# Patient Record
Sex: FEMALE | Race: WHITE | NOT HISPANIC OR LATINO | Employment: OTHER | URBAN - METROPOLITAN AREA
[De-identification: names, ages, dates, MRNs, and addresses within clinical notes are randomized per-mention and may not be internally consistent; named-entity substitution may affect disease eponyms.]

---

## 2017-02-01 ENCOUNTER — OFFICE VISIT (OUTPATIENT)
Dept: FAMILY MEDICINE CLINIC | Facility: CLINIC | Age: 57
End: 2017-02-01

## 2017-02-01 VITALS
TEMPERATURE: 97.9 F | OXYGEN SATURATION: 97 % | HEART RATE: 83 BPM | SYSTOLIC BLOOD PRESSURE: 124 MMHG | HEIGHT: 67 IN | BODY MASS INDEX: 26.68 KG/M2 | WEIGHT: 170 LBS | DIASTOLIC BLOOD PRESSURE: 70 MMHG

## 2017-02-01 DIAGNOSIS — R23.3 ECCHYMOSES, SPONTANEOUS: Primary | ICD-10-CM

## 2017-02-01 DIAGNOSIS — I10 ESSENTIAL HYPERTENSION: ICD-10-CM

## 2017-02-01 DIAGNOSIS — H81.03 MENIERE DISEASE, BILATERAL: ICD-10-CM

## 2017-02-01 LAB
BASOPHILS # BLD AUTO: 0.03 10*3/MM3 (ref 0–0.2)
BASOPHILS NFR BLD AUTO: 0.4 % (ref 0–1)
DEPRECATED RDW RBC AUTO: 42.3 FL (ref 37–54)
EOSINOPHIL # BLD AUTO: 0.18 10*3/MM3 (ref 0.1–0.3)
EOSINOPHIL NFR BLD AUTO: 2.5 % (ref 0–3)
ERYTHROCYTE [DISTWIDTH] IN BLOOD BY AUTOMATED COUNT: 12.7 % (ref 11.3–14.5)
HCT VFR BLD AUTO: 42.6 % (ref 34.5–44)
HGB BLD-MCNC: 14 G/DL (ref 11.5–15.5)
IMM GRANULOCYTES # BLD: 0.03 10*3/MM3 (ref 0–0.03)
IMM GRANULOCYTES NFR BLD: 0.4 % (ref 0–0.6)
LYMPHOCYTES # BLD AUTO: 2.4 10*3/MM3 (ref 0.6–4.8)
LYMPHOCYTES NFR BLD AUTO: 33.3 % (ref 24–44)
MCH RBC QN AUTO: 29.7 PG (ref 27–31)
MCHC RBC AUTO-ENTMCNC: 32.9 G/DL (ref 32–36)
MCV RBC AUTO: 90.4 FL (ref 80–99)
MONOCYTES # BLD AUTO: 0.4 10*3/MM3 (ref 0–1)
MONOCYTES NFR BLD AUTO: 5.6 % (ref 0–12)
NEUTROPHILS # BLD AUTO: 4.16 10*3/MM3 (ref 1.5–8.3)
NEUTROPHILS NFR BLD AUTO: 57.8 % (ref 41–71)
PLATELET # BLD AUTO: 358 10*3/MM3 (ref 150–450)
PLATELET # BLD AUTO: 358 10*3/MM3 (ref 150–450)
PMV BLD AUTO: 10.7 FL (ref 6–12)
RBC # BLD AUTO: 4.71 10*6/MM3 (ref 3.89–5.14)
WBC NRBC COR # BLD: 7.2 10*3/MM3 (ref 3.5–10.8)

## 2017-02-01 PROCEDURE — 85025 COMPLETE CBC W/AUTO DIFF WBC: CPT | Performed by: FAMILY MEDICINE

## 2017-02-01 PROCEDURE — 85049 AUTOMATED PLATELET COUNT: CPT | Performed by: FAMILY MEDICINE

## 2017-02-01 PROCEDURE — 36415 COLL VENOUS BLD VENIPUNCTURE: CPT | Performed by: FAMILY MEDICINE

## 2017-02-01 PROCEDURE — 99213 OFFICE O/P EST LOW 20 MIN: CPT | Performed by: FAMILY MEDICINE

## 2017-02-01 RX ORDER — LOSARTAN POTASSIUM 25 MG/1
TABLET ORAL
COMMUNITY
Start: 2014-11-04 | End: 2017-02-01 | Stop reason: SDUPTHER

## 2017-02-01 RX ORDER — ESTRADIOL 0.1 MG/D
FILM, EXTENDED RELEASE TRANSDERMAL 2 TIMES WEEKLY
COMMUNITY
Start: 2014-10-04

## 2017-02-01 RX ORDER — ESTRADIOL 10 UG/1
INSERT VAGINAL
COMMUNITY
Start: 2014-08-19 | End: 2018-09-14

## 2017-02-01 RX ORDER — DIAZEPAM 2 MG/1
2 TABLET ORAL 2 TIMES DAILY PRN
Qty: 30 TABLET | Refills: 0
Start: 2017-02-01 | End: 2018-09-14

## 2017-02-01 RX ORDER — LOSARTAN POTASSIUM 25 MG/1
25 TABLET ORAL DAILY
Qty: 90 TABLET | Refills: 0 | Status: SHIPPED | OUTPATIENT
Start: 2017-02-01 | End: 2017-02-05 | Stop reason: SDUPTHER

## 2017-02-01 RX ORDER — DIAZEPAM 2 MG/1
2 TABLET ORAL 2 TIMES DAILY PRN
COMMUNITY
End: 2017-02-01 | Stop reason: SDUPTHER

## 2017-02-01 NOTE — PROGRESS NOTES
"Jenny Gonsalez is a 56 y.o. female.     History of Present Illness   The patient presents today with c/o bruising of her legs.  States this is intermittent.    Also a follow up on Hypertension.    BP is 124/70.  Taking Losartan 25 mg daily    Also needing a refill for Diazepam 2 mg to use as needed for inner ear.    The following portions of the patient's history were reviewed and updated as appropriate: allergies, current medications, past social history and problem list.    Review of Systems   Hematological: Bruises/bleeds easily.       Objective   Visit Vitals   • /70   • Pulse 83   • Temp 97.9 °F (36.6 °C)   • Ht 67\" (170.2 cm)   • Wt 170 lb (77.1 kg)   • SpO2 97%   • BMI 26.63 kg/m2     Physical Exam   Constitutional: She appears well-developed and well-nourished.   Skin: Bruising (right shin) noted.   Nursing note and vitals reviewed.      Assessment/Plan   Problem List Items Addressed This Visit     None      Visit Diagnoses     Ecchymoses, spontaneous    -  Primary    Meniere disease, bilateral                  Refill for Losartan 25 mg daily #90+3.  Refill Diazepam 2 mg twice a day as needed. #30+0.    Check a CBC and Platelet count. Report results by letter.    Follow up as needed.                    Scribed for Dr Jack Avery by Gogo Sandhu CMA.    I, Jack Avery MD, personally performed the services described in this documentation, as scribed by Gogo Sandhu in my presence, and is both accurate and complete.    "

## 2017-02-05 DIAGNOSIS — I10 ESSENTIAL HYPERTENSION: ICD-10-CM

## 2017-02-06 RX ORDER — LOSARTAN POTASSIUM 25 MG/1
TABLET ORAL
Qty: 90 TABLET | Refills: 0 | Status: SHIPPED | OUTPATIENT
Start: 2017-02-06 | End: 2017-07-16 | Stop reason: SDUPTHER

## 2017-02-22 ENCOUNTER — TELEPHONE (OUTPATIENT)
Dept: FAMILY MEDICINE CLINIC | Facility: CLINIC | Age: 57
End: 2017-02-22

## 2017-02-22 NOTE — TELEPHONE ENCOUNTER
I spoke to the patient on the telephone. I advised her that her CBC and platelet count were both normal. She was glad to learn of this.

## 2017-02-23 ENCOUNTER — TELEPHONE (OUTPATIENT)
Dept: FAMILY MEDICINE CLINIC | Facility: CLINIC | Age: 57
End: 2017-02-23

## 2017-02-23 NOTE — TELEPHONE ENCOUNTER
----- Message from Micaela Melgar sent at 2/22/2017  2:09 PM EST -----  Contact: [phone]  PATIENT NEEDS TO HAVE A CALL BACK TO DISCUSS THE STATUS OF HER LAB LETTER

## 2017-07-16 DIAGNOSIS — I10 ESSENTIAL HYPERTENSION: ICD-10-CM

## 2017-07-17 RX ORDER — LOSARTAN POTASSIUM 25 MG/1
TABLET ORAL
Qty: 90 TABLET | Refills: 0 | Status: SHIPPED | OUTPATIENT
Start: 2017-07-17 | End: 2017-12-13 | Stop reason: SDUPTHER

## 2017-12-13 DIAGNOSIS — I10 ESSENTIAL HYPERTENSION: ICD-10-CM

## 2017-12-13 RX ORDER — LOSARTAN POTASSIUM 25 MG/1
TABLET ORAL
Qty: 90 TABLET | Refills: 0 | Status: SHIPPED | OUTPATIENT
Start: 2017-12-13 | End: 2018-02-25 | Stop reason: SDUPTHER

## 2018-02-25 DIAGNOSIS — I10 ESSENTIAL HYPERTENSION: ICD-10-CM

## 2018-02-25 RX ORDER — LOSARTAN POTASSIUM 25 MG/1
25 TABLET ORAL DAILY
Qty: 90 TABLET | Refills: 1 | Status: SHIPPED | OUTPATIENT
Start: 2018-02-25 | End: 2018-08-16 | Stop reason: SDUPTHER

## 2018-04-13 ENCOUNTER — TRANSCRIBE ORDERS (OUTPATIENT)
Dept: ADMINISTRATIVE | Facility: HOSPITAL | Age: 58
End: 2018-04-13

## 2018-04-13 DIAGNOSIS — R10.11 RUQ ABDOMINAL PAIN: Primary | ICD-10-CM

## 2018-04-17 ENCOUNTER — HOSPITAL ENCOUNTER (OUTPATIENT)
Dept: CT IMAGING | Facility: HOSPITAL | Age: 58
Discharge: HOME | End: 2018-04-17
Attending: SURGERY | Admitting: SURGERY

## 2018-04-17 DIAGNOSIS — R10.11 RUQ ABDOMINAL PAIN: ICD-10-CM

## 2018-04-17 PROCEDURE — 74150 CT ABDOMEN W/O CONTRAST: CPT

## 2018-04-17 RX ADMIN — BARIUM SULFATE 450 ML: 21 SUSPENSION ORAL at 08:40

## 2018-05-03 ENCOUNTER — APPOINTMENT (OUTPATIENT)
Dept: PREADMISSION TESTING | Facility: HOSPITAL | Age: 58
End: 2018-05-03

## 2018-05-03 LAB
ALBUMIN SERPL-MCNC: 4 G/DL (ref 3.2–4.8)
ALBUMIN/GLOB SERPL: 1.5 G/DL (ref 1.5–2.5)
ALP SERPL-CCNC: 68 U/L (ref 25–100)
ALT SERPL W P-5'-P-CCNC: 33 U/L (ref 7–40)
ANION GAP SERPL CALCULATED.3IONS-SCNC: 6 MMOL/L (ref 3–11)
AST SERPL-CCNC: 20 U/L (ref 0–33)
BILIRUB SERPL-MCNC: 0.4 MG/DL (ref 0.3–1.2)
BUN BLD-MCNC: 9 MG/DL (ref 9–23)
BUN/CREAT SERPL: 10 (ref 7–25)
CALCIUM SPEC-SCNC: 8.7 MG/DL (ref 8.7–10.4)
CHLORIDE SERPL-SCNC: 102 MMOL/L (ref 99–109)
CO2 SERPL-SCNC: 30 MMOL/L (ref 20–31)
CREAT BLD-MCNC: 0.9 MG/DL (ref 0.6–1.3)
DEPRECATED RDW RBC AUTO: 43.1 FL (ref 37–54)
ERYTHROCYTE [DISTWIDTH] IN BLOOD BY AUTOMATED COUNT: 13.3 % (ref 11.3–14.5)
GFR SERPL CREATININE-BSD FRML MDRD: 64 ML/MIN/1.73
GLOBULIN UR ELPH-MCNC: 2.6 GM/DL
GLUCOSE BLD-MCNC: 71 MG/DL (ref 70–100)
HCT VFR BLD AUTO: 41.6 % (ref 34.5–44)
HGB BLD-MCNC: 13.5 G/DL (ref 11.5–15.5)
MCH RBC QN AUTO: 28.8 PG (ref 27–31)
MCHC RBC AUTO-ENTMCNC: 32.5 G/DL (ref 32–36)
MCV RBC AUTO: 88.7 FL (ref 80–99)
PLATELET # BLD AUTO: 322 10*3/MM3 (ref 150–450)
PMV BLD AUTO: 10.6 FL (ref 6–12)
POTASSIUM BLD-SCNC: 3.7 MMOL/L (ref 3.5–5.5)
PROT SERPL-MCNC: 6.6 G/DL (ref 5.7–8.2)
RBC # BLD AUTO: 4.69 10*6/MM3 (ref 3.89–5.14)
SODIUM BLD-SCNC: 138 MMOL/L (ref 132–146)
WBC NRBC COR # BLD: 6.79 10*3/MM3 (ref 3.5–10.8)

## 2018-05-03 PROCEDURE — 93005 ELECTROCARDIOGRAM TRACING: CPT

## 2018-05-03 PROCEDURE — 80053 COMPREHEN METABOLIC PANEL: CPT | Performed by: SURGERY

## 2018-05-03 PROCEDURE — 93010 ELECTROCARDIOGRAM REPORT: CPT | Performed by: INTERNAL MEDICINE

## 2018-05-03 PROCEDURE — 36415 COLL VENOUS BLD VENIPUNCTURE: CPT

## 2018-05-03 PROCEDURE — 85027 COMPLETE CBC AUTOMATED: CPT | Performed by: SURGERY

## 2018-05-08 ENCOUNTER — HOSPITAL ENCOUNTER (OUTPATIENT)
Facility: HOSPITAL | Age: 58
Setting detail: HOSPITAL OUTPATIENT SURGERY
Discharge: HOME | End: 2018-05-08
Attending: SURGERY | Admitting: SURGERY

## 2018-05-08 ENCOUNTER — ANESTHESIA (OUTPATIENT)
Dept: PERIOP | Facility: HOSPITAL | Age: 58
End: 2018-05-08

## 2018-05-08 ENCOUNTER — ANESTHESIA EVENT (OUTPATIENT)
Dept: PERIOP | Facility: HOSPITAL | Age: 58
End: 2018-05-08

## 2018-05-08 ENCOUNTER — APPOINTMENT (OUTPATIENT)
Dept: GENERAL RADIOLOGY | Facility: HOSPITAL | Age: 58
End: 2018-05-08

## 2018-05-08 VITALS
SYSTOLIC BLOOD PRESSURE: 119 MMHG | TEMPERATURE: 97.6 F | OXYGEN SATURATION: 97 % | RESPIRATION RATE: 17 BRPM | HEART RATE: 75 BPM | DIASTOLIC BLOOD PRESSURE: 71 MMHG

## 2018-05-08 DIAGNOSIS — K80.20 CHOLELITHIASIS: ICD-10-CM

## 2018-05-08 PROBLEM — H81.10 BENIGN PAROXYSMAL POSITIONAL VERTIGO: Status: ACTIVE | Noted: 2018-05-08

## 2018-05-08 PROBLEM — I10 HYPERTENSION: Status: ACTIVE | Noted: 2018-05-08

## 2018-05-08 LAB — POTASSIUM BLDA-SCNC: 4.31 MMOL/L (ref 3.5–5.3)

## 2018-05-08 PROCEDURE — 25010000002 PROPOFOL 10 MG/ML EMULSION: Performed by: NURSE ANESTHETIST, CERTIFIED REGISTERED

## 2018-05-08 PROCEDURE — 88304 TISSUE EXAM BY PATHOLOGIST: CPT | Performed by: SURGERY

## 2018-05-08 PROCEDURE — 84132 ASSAY OF SERUM POTASSIUM: CPT | Performed by: ANESTHESIOLOGY

## 2018-05-08 PROCEDURE — 74300 X-RAY BILE DUCTS/PANCREAS: CPT

## 2018-05-08 PROCEDURE — 25010000002 FENTANYL CITRATE (PF) 100 MCG/2ML SOLUTION: Performed by: NURSE ANESTHETIST, CERTIFIED REGISTERED

## 2018-05-08 PROCEDURE — 25010000003 CEFAZOLIN IN DEXTROSE 2-4 GM/100ML-% SOLUTION: Performed by: SURGERY

## 2018-05-08 PROCEDURE — 25010000002 ONDANSETRON PER 1 MG: Performed by: NURSE ANESTHETIST, CERTIFIED REGISTERED

## 2018-05-08 PROCEDURE — 25010000002 IOPAMIDOL 61 % SOLUTION: Performed by: SURGERY

## 2018-05-08 PROCEDURE — 25010000002 PHENYLEPHRINE PER 1 ML: Performed by: NURSE ANESTHETIST, CERTIFIED REGISTERED

## 2018-05-08 PROCEDURE — 25010000002 PROMETHAZINE PER 50 MG: Performed by: NURSE ANESTHETIST, CERTIFIED REGISTERED

## 2018-05-08 PROCEDURE — 25010000002 PROPOFOL 1000 MG/ML EMULSION: Performed by: NURSE ANESTHETIST, CERTIFIED REGISTERED

## 2018-05-08 PROCEDURE — 25010000002 MIDAZOLAM PER 1 MG: Performed by: NURSE ANESTHETIST, CERTIFIED REGISTERED

## 2018-05-08 PROCEDURE — 25010000002 NEOSTIGMINE 10 MG/10ML SOLUTION: Performed by: NURSE ANESTHETIST, CERTIFIED REGISTERED

## 2018-05-08 PROCEDURE — 25010000002 DEXAMETHASONE PER 1 MG: Performed by: NURSE ANESTHETIST, CERTIFIED REGISTERED

## 2018-05-08 RX ORDER — PROPOFOL 10 MG/ML
VIAL (ML) INTRAVENOUS AS NEEDED
Status: DISCONTINUED | OUTPATIENT
Start: 2018-05-08 | End: 2018-05-08 | Stop reason: SURG

## 2018-05-08 RX ORDER — MIDAZOLAM HYDROCHLORIDE 1 MG/ML
INJECTION INTRAMUSCULAR; INTRAVENOUS AS NEEDED
Status: DISCONTINUED | OUTPATIENT
Start: 2018-05-08 | End: 2018-05-08 | Stop reason: SURG

## 2018-05-08 RX ORDER — ONDANSETRON 2 MG/ML
INJECTION INTRAMUSCULAR; INTRAVENOUS AS NEEDED
Status: DISCONTINUED | OUTPATIENT
Start: 2018-05-08 | End: 2018-05-08 | Stop reason: SURG

## 2018-05-08 RX ORDER — FENTANYL CITRATE 50 UG/ML
50 INJECTION, SOLUTION INTRAMUSCULAR; INTRAVENOUS
Status: DISCONTINUED | OUTPATIENT
Start: 2018-05-08 | End: 2018-05-08 | Stop reason: HOSPADM

## 2018-05-08 RX ORDER — FAMOTIDINE 20 MG/1
20 TABLET, FILM COATED ORAL ONCE
Status: COMPLETED | OUTPATIENT
Start: 2018-05-08 | End: 2018-05-08

## 2018-05-08 RX ORDER — SCOLOPAMINE TRANSDERMAL SYSTEM 1 MG/1
1 PATCH, EXTENDED RELEASE TRANSDERMAL
Status: DISCONTINUED | OUTPATIENT
Start: 2018-05-08 | End: 2018-05-08 | Stop reason: HOSPADM

## 2018-05-08 RX ORDER — CEFAZOLIN SODIUM 2 G/100ML
2 INJECTION, SOLUTION INTRAVENOUS ONCE
Status: COMPLETED | OUTPATIENT
Start: 2018-05-08 | End: 2018-05-08

## 2018-05-08 RX ORDER — SODIUM CHLORIDE, SODIUM LACTATE, POTASSIUM CHLORIDE, CALCIUM CHLORIDE 600; 310; 30; 20 MG/100ML; MG/100ML; MG/100ML; MG/100ML
9 INJECTION, SOLUTION INTRAVENOUS CONTINUOUS
Status: DISCONTINUED | OUTPATIENT
Start: 2018-05-08 | End: 2018-05-08 | Stop reason: HOSPADM

## 2018-05-08 RX ORDER — SODIUM CHLORIDE 9 MG/ML
INJECTION, SOLUTION INTRAVENOUS AS NEEDED
Status: DISCONTINUED | OUTPATIENT
Start: 2018-05-08 | End: 2018-05-08 | Stop reason: HOSPADM

## 2018-05-08 RX ORDER — SODIUM CHLORIDE 0.9 % (FLUSH) 0.9 %
1-10 SYRINGE (ML) INJECTION AS NEEDED
Status: DISCONTINUED | OUTPATIENT
Start: 2018-05-08 | End: 2018-05-08 | Stop reason: HOSPADM

## 2018-05-08 RX ORDER — PROMETHAZINE HYDROCHLORIDE 25 MG/1
25 TABLET ORAL EVERY 6 HOURS PRN
Qty: 30 TABLET | Refills: 0 | Status: SHIPPED | OUTPATIENT
Start: 2018-05-08 | End: 2020-06-02

## 2018-05-08 RX ORDER — FENTANYL CITRATE 50 UG/ML
INJECTION, SOLUTION INTRAMUSCULAR; INTRAVENOUS AS NEEDED
Status: DISCONTINUED | OUTPATIENT
Start: 2018-05-08 | End: 2018-05-08 | Stop reason: SURG

## 2018-05-08 RX ORDER — PROMETHAZINE HYDROCHLORIDE 25 MG/ML
6.25 INJECTION, SOLUTION INTRAMUSCULAR; INTRAVENOUS ONCE AS NEEDED
Status: COMPLETED | OUTPATIENT
Start: 2018-05-08 | End: 2018-05-08

## 2018-05-08 RX ORDER — DEXAMETHASONE SODIUM PHOSPHATE 10 MG/ML
INJECTION INTRAMUSCULAR; INTRAVENOUS AS NEEDED
Status: DISCONTINUED | OUTPATIENT
Start: 2018-05-08 | End: 2018-05-08 | Stop reason: SURG

## 2018-05-08 RX ORDER — PROMETHAZINE HYDROCHLORIDE 12.5 MG/1
12.5 TABLET ORAL ONCE AS NEEDED
Status: DISCONTINUED | OUTPATIENT
Start: 2018-05-08 | End: 2018-05-08 | Stop reason: HOSPADM

## 2018-05-08 RX ORDER — DOCUSATE SODIUM 250 MG
250 CAPSULE ORAL 2 TIMES DAILY
Qty: 20 CAPSULE | Refills: 0 | Status: SHIPPED | OUTPATIENT
Start: 2018-05-08 | End: 2018-09-14

## 2018-05-08 RX ORDER — ONDANSETRON 2 MG/ML
4 INJECTION INTRAMUSCULAR; INTRAVENOUS ONCE AS NEEDED
Status: DISCONTINUED | OUTPATIENT
Start: 2018-05-08 | End: 2018-05-08 | Stop reason: HOSPADM

## 2018-05-08 RX ORDER — OXYCODONE HYDROCHLORIDE AND ACETAMINOPHEN 5; 325 MG/1; MG/1
1 TABLET ORAL ONCE AS NEEDED
Status: DISCONTINUED | OUTPATIENT
Start: 2018-05-08 | End: 2018-05-08 | Stop reason: HOSPADM

## 2018-05-08 RX ORDER — OXYCODONE HYDROCHLORIDE AND ACETAMINOPHEN 5; 325 MG/1; MG/1
1-2 TABLET ORAL EVERY 4 HOURS PRN
Qty: 37 TABLET | Refills: 0 | Status: SHIPPED | OUTPATIENT
Start: 2018-05-08 | End: 2018-09-14

## 2018-05-08 RX ORDER — PROMETHAZINE HYDROCHLORIDE 25 MG/1
25 SUPPOSITORY RECTAL ONCE AS NEEDED
Status: COMPLETED | OUTPATIENT
Start: 2018-05-08 | End: 2018-05-08

## 2018-05-08 RX ORDER — PROMETHAZINE HYDROCHLORIDE 25 MG/1
25 TABLET ORAL ONCE AS NEEDED
Status: COMPLETED | OUTPATIENT
Start: 2018-05-08 | End: 2018-05-08

## 2018-05-08 RX ORDER — ROCURONIUM BROMIDE 10 MG/ML
INJECTION, SOLUTION INTRAVENOUS AS NEEDED
Status: DISCONTINUED | OUTPATIENT
Start: 2018-05-08 | End: 2018-05-08 | Stop reason: SURG

## 2018-05-08 RX ORDER — NEOSTIGMINE METHYLSULFATE 1 MG/ML
INJECTION, SOLUTION INTRAVENOUS AS NEEDED
Status: DISCONTINUED | OUTPATIENT
Start: 2018-05-08 | End: 2018-05-08 | Stop reason: SURG

## 2018-05-08 RX ORDER — HYDROMORPHONE HYDROCHLORIDE 1 MG/ML
0.5 INJECTION, SOLUTION INTRAMUSCULAR; INTRAVENOUS; SUBCUTANEOUS
Status: DISCONTINUED | OUTPATIENT
Start: 2018-05-08 | End: 2018-05-08 | Stop reason: HOSPADM

## 2018-05-08 RX ORDER — DOCUSATE SODIUM 100 MG/1
100 CAPSULE, LIQUID FILLED ORAL ONCE
Status: DISCONTINUED | OUTPATIENT
Start: 2018-05-08 | End: 2018-05-08 | Stop reason: HOSPADM

## 2018-05-08 RX ORDER — LIDOCAINE HYDROCHLORIDE 10 MG/ML
0.5 INJECTION, SOLUTION EPIDURAL; INFILTRATION; INTRACAUDAL; PERINEURAL ONCE AS NEEDED
Status: COMPLETED | OUTPATIENT
Start: 2018-05-08 | End: 2018-05-08

## 2018-05-08 RX ORDER — GLYCOPYRROLATE 0.2 MG/ML
INJECTION INTRAMUSCULAR; INTRAVENOUS AS NEEDED
Status: DISCONTINUED | OUTPATIENT
Start: 2018-05-08 | End: 2018-05-08 | Stop reason: SURG

## 2018-05-08 RX ORDER — BUPIVACAINE HYDROCHLORIDE AND EPINEPHRINE 5; 5 MG/ML; UG/ML
INJECTION, SOLUTION PERINEURAL AS NEEDED
Status: DISCONTINUED | OUTPATIENT
Start: 2018-05-08 | End: 2018-05-08 | Stop reason: HOSPADM

## 2018-05-08 RX ORDER — LIDOCAINE HYDROCHLORIDE 10 MG/ML
INJECTION, SOLUTION EPIDURAL; INFILTRATION; INTRACAUDAL; PERINEURAL AS NEEDED
Status: DISCONTINUED | OUTPATIENT
Start: 2018-05-08 | End: 2018-05-08 | Stop reason: SURG

## 2018-05-08 RX ADMIN — SCOPALAMINE 1 PATCH: 1 PATCH, EXTENDED RELEASE TRANSDERMAL at 13:28

## 2018-05-08 RX ADMIN — ROCURONIUM BROMIDE 10 MG: 10 SOLUTION INTRAVENOUS at 13:50

## 2018-05-08 RX ADMIN — ONDANSETRON 4 MG: 2 INJECTION INTRAMUSCULAR; INTRAVENOUS at 14:21

## 2018-05-08 RX ADMIN — LIDOCAINE HYDROCHLORIDE 50 MG: 10 INJECTION, SOLUTION EPIDURAL; INFILTRATION; INTRACAUDAL; PERINEURAL at 13:35

## 2018-05-08 RX ADMIN — CEFAZOLIN SODIUM 2 G: 2 INJECTION, SOLUTION INTRAVENOUS at 13:30

## 2018-05-08 RX ADMIN — PROPOFOL 30 MCG/KG/MIN: 10 INJECTION, EMULSION INTRAVENOUS at 13:35

## 2018-05-08 RX ADMIN — DEXAMETHASONE SODIUM PHOSPHATE 8 MG: 10 INJECTION INTRAMUSCULAR; INTRAVENOUS at 13:47

## 2018-05-08 RX ADMIN — FENTANYL CITRATE 50 MCG: 50 INJECTION, SOLUTION INTRAMUSCULAR; INTRAVENOUS at 14:19

## 2018-05-08 RX ADMIN — SODIUM CHLORIDE, POTASSIUM CHLORIDE, SODIUM LACTATE AND CALCIUM CHLORIDE 9 ML/HR: 600; 310; 30; 20 INJECTION, SOLUTION INTRAVENOUS at 09:36

## 2018-05-08 RX ADMIN — PHENYLEPHRINE HYDROCHLORIDE 100 MCG: 10 INJECTION INTRAVENOUS at 13:48

## 2018-05-08 RX ADMIN — GLYCOPYRROLATE 0.4 MG: 0.2 INJECTION, SOLUTION INTRAMUSCULAR; INTRAVENOUS at 14:20

## 2018-05-08 RX ADMIN — MIDAZOLAM HYDROCHLORIDE 2 MG: 1 INJECTION, SOLUTION INTRAMUSCULAR; INTRAVENOUS at 13:35

## 2018-05-08 RX ADMIN — PROPOFOL 200 MG: 10 INJECTION, EMULSION INTRAVENOUS at 13:35

## 2018-05-08 RX ADMIN — ROCURONIUM BROMIDE 30 MG: 10 SOLUTION INTRAVENOUS at 13:35

## 2018-05-08 RX ADMIN — FENTANYL CITRATE 50 MCG: 50 INJECTION, SOLUTION INTRAMUSCULAR; INTRAVENOUS at 15:00

## 2018-05-08 RX ADMIN — LIDOCAINE HYDROCHLORIDE 0.3 ML: 10 INJECTION, SOLUTION EPIDURAL; INFILTRATION; INTRACAUDAL; PERINEURAL at 09:36

## 2018-05-08 RX ADMIN — FAMOTIDINE 20 MG: 20 TABLET ORAL at 09:36

## 2018-05-08 RX ADMIN — NEOSTIGMINE METHYLSULFATE 2.5 MG: 1 INJECTION, SOLUTION INTRAVENOUS at 14:20

## 2018-05-08 RX ADMIN — PROMETHAZINE HYDROCHLORIDE 6.25 MG: 25 INJECTION INTRAMUSCULAR; INTRAVENOUS at 14:50

## 2018-05-08 RX ADMIN — FENTANYL CITRATE 50 MCG: 50 INJECTION, SOLUTION INTRAMUSCULAR; INTRAVENOUS at 15:20

## 2018-05-08 NOTE — ANESTHESIA PREPROCEDURE EVALUATION
Anesthesia Evaluation     Patient summary reviewed and Nursing notes reviewed   history of anesthetic complications: PONV  NPO Solid Status: > 8 hours  NPO Liquid Status: > 8 hours           Airway   TM distance: >3 FB  Neck ROM: full  No difficulty expected  Dental      Pulmonary     breath sounds clear to auscultation  Cardiovascular     Rhythm: regular  Rate: normal    (+) hypertension,       Neuro/Psych  (+) headaches,     GI/Hepatic/Renal/Endo      Musculoskeletal     Abdominal    Substance History      OB/GYN          Other                        Anesthesia Plan    ASA 2     general     intravenous induction   Anesthetic plan and risks discussed with patient.    Plan discussed with CRNA.

## 2018-05-08 NOTE — ANESTHESIA PROCEDURE NOTES
Airway  Urgency: elective    Airway not difficult    General Information and Staff    Patient location during procedure: OR  CRNA: NENITA ARAYA    Indications and Patient Condition  Indications for airway management: airway protection    Preoxygenated: yes  MILS not maintained throughout  Mask difficulty assessment: 2 - vent by mask + OA or adjuvant +/- NMBA    Final Airway Details  Final airway type: endotracheal airway      Successful airway: ETT  Cuffed: yes   Successful intubation technique: direct laryngoscopy  Endotracheal tube insertion site: oral  Blade: Brandon  Blade size: #3  ETT size: 7.0 mm  Cormack-Lehane Classification: grade IIa - partial view of glottis  Placement verified by: chest auscultation and capnometry   Measured from: lips  ETT to lips (cm): 20  Number of attempts at approach: 1    Additional Comments  Negative epigastric sounds, Breath sound equal bilaterally with symmetric chest rise and fall

## 2018-05-08 NOTE — ANESTHESIA POSTPROCEDURE EVALUATION
Patient: Nicole Gonsalez    Procedure Summary     Date:  05/08/18 Room / Location:   [facility] [address] [place] [address] [facility]    Anesthesia Start:  1330 Anesthesia Stop:      Procedure:  CHOLECYSTECTOMY LAPAROSCOPIC INTRAOPERATIVE CHOLANGIOGRAM (N/A Abdomen) Diagnosis:      Surgeon:  Krunal Sanchez MD Provider:  Bandar Harris MD    Anesthesia Type:  general ASA Status:  2          Anesthesia Type: general  Last vitals  BP   111/82   Temp   98   Pulse   75   Resp   18   SpO2   95%     Post Anesthesia Care and Evaluation    Patient location during evaluation: PACU  Patient participation: complete - patient cannot participate  Post-procedure mental status: asleep.  Pain management: adequate  Airway patency: patent  Anesthetic complications: No anesthetic complications  PONV Status: none  Cardiovascular status: acceptable  Respiratory status: acceptable  Hydration status: acceptable

## 2018-05-08 NOTE — OP NOTE
Operative Report    Patient Name:  Nicole Gonsalez  YOB: 1960  [id number]  5/8/2018      PREOPERATIVE DIAGNOSIS: Symptomatic cholelithiasis      POSTOPERATIVE DIAGNOSIS: Same        PROCEDURE PERFORMED:     Laparoscopic cholecystectomy with intra-operative cholangiography        SURGEON: Krunal Sanchez MD      ASSISTANT: Joseph Klein MD       SPECIMENS: Gallbladder and contents        ANESTHESIA: General.        FINDINGS:     1) Gallbladder in standard positioning     2) Intra-operative cholangiogram demonstrated excellent filling of the cystic, common, right and left hepatic ducts with good flow of contrast into the duodenum without retained stone or filling defect        INDICATIONS:      The patient is a 57 y.o. female with a history of abdominal pain, concerning for symptomatic cholelithiasis. Pre-operative imaging including U/S and CT scan confirmed the diagnosis. The risks and benefits of Laparoscopic cholecystectomy with cholangiography were discussed with the patient and their family and they agree to proceed.        DESCRIPTION OF PROCEDURE:     After obtaining informed consent, the patient was taken to the operating room and placed in the supine position. After appropriate DVT and antibiotic prophylaxis, general anesthesia was induced. The abdomen was prepped and draped in standard sterile fashion, and after infiltrating the skin with local anesthetic, a transverse 12mm skin incision was made just superior to the umbilicus. Blunt dissection was carried down to the base of the umbilicus, which was grasped with a Kocher clamp and elevated anteriorly. A vertical midline incision was made in the fascia, and blunt dissection was carried down into the peritoneal cavity. A stay suture of 0 Vicryl was then placed in figure-of-eight fashion around the defect, and a blunt trocar advanced without difficulty into the abdominal cavity.  The abdomen was insufflated with carbon dioxide gas to a  "pressure of 15 mm Hg, and a laparoscope advanced through the trocar and the abdominal contents were inspected. There was no evidence of bowel, bladder, or visceral entry with entrance of the trocar . At this point, after infiltrating the skin with local anesthetic, a standard laparoscopic cholecystectomy trocar placement schema was followed.     The gallbladder was grasped and elevated superiorly. Using meticulous blunt dissection , the cystic duct and cystic artery were bluntly dissected free of other structures and clearly identified using the \"Critical View\" technique. The cystic artery was then clipped twice proximally and once distally, and divided between the clips.     The cystic duct was then clipped at its junction with the infundibulum of the gallbladder, and transected across 50% of its circumference. A cholangiogram catheter was then placed within the duct, and on-table cholangiography under fluoroscopy was obtained. There was excellent filling of the cystic, common, right and left hepatic ducts with good flow of contrast into the duodenum without retained stone or filling defect  . The cholangiogram catheter was then removed, and the cystic duct was ligated using a 2-0 silk suture tied laparoscopically,  reinforced with hemoclips, and divided.     The gallbladder was then dissected free of the gallbladder fossa using a combination of electrocautery and blunt dissection . There was a small posterior branch of the artery that was clipped and divided . The gallbladder was then placed in an Endocatch bag, and removed from the inferiormost trocar site. It was inspected on the back table, correlated with intra-operative findings, and passed off as specimen.     The right upper quadrant was then inspected. The cystic duct and cystic artery stumps were intact without bleeding or biliary leak. The right upper quadrant was irrigated with saline until clear. The abdomen was deflated and reinsufflated to make sure " pneumoperitoneum was not tamponading any bleeding and there was none.  The abdomen was again irrigated with saline until clear, and all trocars removed under direct and laparoscopic visualization. The fascia at the inferiormost incision  was closed using the previously placed 0 Vicryl suture. The wounds were irrigated with normal saline, and closed in each area using absorbable subcuticular suture. The incisions were dressed in standard sterile fashion and covered with dry dressings. The patient recovered from anesthesia, was extubated in the operating room, and transferred to the PACU in stable condition.  All sponge and needle counts were correct times two at the completion of the procedure. There were no immediate complications.       Krunal Sanchez MD  5/8/2018  2:25 PM

## 2018-05-08 NOTE — INTERVAL H&P NOTE
H&P reviewed. The patient was examined and there are no changes to the H&P.       Heart:  RRR  Lungs:  CTAB    Immunizations:    Tetanus: Unknown     Influenza: 2017     Pneumo: No    Labs were reviewed.     EKG: RRR    Plan: Emelia Lap, intraoperative cholangiogram

## 2018-05-08 NOTE — BRIEF OP NOTE
CHOLECYSTECTOMY LAPAROSCOPIC INTRAOPERATIVE CHOLANGIOGRAM  Progress Note    Nicole Gonsalez  5/8/2018    Pre-op Diagnosis:   Symptomatic cholelithiasis       Post-Op Diagnosis Codes:   Same    Procedure/CPT® Codes:      Procedure(s):  CHOLECYSTECTOMY LAPAROSCOPIC INTRAOPERATIVE CHOLANGIOGRAM    Surgeon(s):  MD Joseph Celestin MD    Anesthesia: General    Staff:   Circulator: Kitty Pierson RN; Mireya Flores RN  Radiology Technologist: RT Jacquelyn  Scrub Person: Roseanna Barton; Bennie Shelley  Nursing Assistant: Lori Cosme CNA    Estimated Blood Loss: minimal    Urine Voided: * No values recorded between 5/8/2018  1:30 PM and 5/8/2018  2:25 PM *    Specimens:                ID Type Source Tests Collected by Time   A : GALLBLADDER Tissue Gallbladder TISSUE PATHOLOGY EXAM Krunal Sanchez MD 5/8/2018 1353         Drains:      Findings: IOC (-)    Complications: None      Krunal Sanchez MD     Date: 5/8/2018  Time: 2:25 PM

## 2018-05-09 LAB
CYTO UR: NORMAL
LAB AP CASE REPORT: NORMAL
LAB AP CLINICAL INFORMATION: NORMAL
Lab: NORMAL
PATH REPORT.FINAL DX SPEC: NORMAL
PATH REPORT.GROSS SPEC: NORMAL

## 2018-05-11 ENCOUNTER — OFFICE VISIT (OUTPATIENT)
Dept: FAMILY MEDICINE CLINIC | Facility: CLINIC | Age: 58
End: 2018-05-11

## 2018-05-11 VITALS
HEART RATE: 84 BPM | HEIGHT: 67 IN | WEIGHT: 175.2 LBS | DIASTOLIC BLOOD PRESSURE: 70 MMHG | TEMPERATURE: 97.4 F | SYSTOLIC BLOOD PRESSURE: 124 MMHG | OXYGEN SATURATION: 97 % | BODY MASS INDEX: 27.5 KG/M2

## 2018-05-11 DIAGNOSIS — M79.89 LEFT UPPER EXTREMITY SWELLING: Primary | ICD-10-CM

## 2018-05-11 LAB
HCT VFR BLDA CALC: 40.1 %
HGB BLDA-MCNC: 13.3 G/DL
MCH, POC: 29.8
MCHC, POC: 33.2
MCV, POC: 89.6
PLATELET # BLD: 316 10*3/MM3
PMV BLD: 8 FL
RBC, POC: 4.47
RDW, POC: 12.7
WBC # BLD: 8.8 10*3/UL

## 2018-05-11 PROCEDURE — 85025 COMPLETE CBC W/AUTO DIFF WBC: CPT | Performed by: NURSE PRACTITIONER

## 2018-05-11 PROCEDURE — 99213 OFFICE O/P EST LOW 20 MIN: CPT | Performed by: NURSE PRACTITIONER

## 2018-05-11 NOTE — PROGRESS NOTES
Jenny Gonsalez is a 57 y.o. female.     History of Present Illness Had lap lavell 5/8/18 with Dr Sanchez. Had IV in left arm. This am she noticed her left arm was tender, red and swollen. Had chills and low grade temp 99.8 yesterday.   Feeling better from surgery. Less abd pain. Had some diarrhea, but it is slowing.  She has talked to Dr Sanchez and he ordered a venous doppler to rule out DVT in one hour.    The following portions of the patient's history were reviewed and updated as appropriate: allergies, current medications, past family history, past medical history, past social history, past surgical history and problem list.    Review of Systems   Constitutional: Negative for appetite change, fever, unexpected weight gain and unexpected weight loss.   HENT: Negative for congestion, nosebleeds, sore throat and trouble swallowing.    Eyes: Negative for visual disturbance.   Respiratory: Negative for cough, shortness of breath and wheezing.    Cardiovascular: Negative for chest pain, palpitations and leg swelling.   Gastrointestinal: Negative for abdominal pain, blood in stool, constipation, diarrhea, nausea and vomiting.   Endocrine: Negative for polydipsia, polyphagia and polyuria.   Genitourinary: Negative for dysuria, frequency and hematuria.   Musculoskeletal: Negative for arthralgias, joint swelling and myalgias.   Skin: Negative for rash.        Left forearm erythema and tenderness   Neurological: Negative for dizziness, seizures, syncope and numbness.   Hematological: Negative for adenopathy. Does not bruise/bleed easily.   Psychiatric/Behavioral: Negative for behavioral problems, sleep disturbance and depressed mood. The patient is not nervous/anxious.        Objective   Physical Exam   Constitutional: She is oriented to person, place, and time. She appears well-developed and well-nourished. No distress.   HENT:   Head: Normocephalic and atraumatic.   Right Ear: External ear normal.   Left Ear:  External ear normal.   Nose: Nose normal.   Eyes: Conjunctivae are normal. Right eye exhibits no discharge. Left eye exhibits no discharge. No scleral icterus.   Neck: Normal range of motion.   Cardiovascular: Normal rate.    Pulmonary/Chest: Effort normal. No respiratory distress.   Abdominal: Soft.   Abd with 4 puncture wounds. Slight bruising. No erythema or discharge.   Musculoskeletal: She exhibits no deformity.   Neurological: She is alert and oriented to person, place, and time. Coordination normal.   Skin: Skin is warm and dry. There is erythema.   Left dorsal forearm with 13 cm area of induration extending to just inferior of AC fossa. Skin is erythematous and warm to touch. Pulses intact.   Psychiatric: She has a normal mood and affect. Her behavior is normal. Judgment and thought content normal.   Vitals reviewed.        Assessment/Plan   Nicole was seen today for arm pain.    Diagnoses and all orders for this visit:    Left upper extremity swelling  -     POC CBC With / Auto Diff    Discussed diffs to include cellulitis, plebitis and dvt.  WBC 8.8 66% gran.  Patient notified of findings. She is on her way to [facility] for venous doppler ordered by Dr Sanchez.  Follow up symptoms persist or worsen.

## 2018-08-16 DIAGNOSIS — I10 ESSENTIAL HYPERTENSION: ICD-10-CM

## 2018-08-17 RX ORDER — LOSARTAN POTASSIUM 25 MG/1
25 TABLET ORAL DAILY
Qty: 90 TABLET | Refills: 1 | Status: SHIPPED | OUTPATIENT
Start: 2018-08-17 | End: 2019-01-30 | Stop reason: SDUPTHER

## 2018-09-14 ENCOUNTER — OFFICE VISIT (OUTPATIENT)
Dept: FAMILY MEDICINE CLINIC | Facility: CLINIC | Age: 58
End: 2018-09-14

## 2018-09-14 VITALS
SYSTOLIC BLOOD PRESSURE: 110 MMHG | RESPIRATION RATE: 20 BRPM | WEIGHT: 171 LBS | OXYGEN SATURATION: 97 % | TEMPERATURE: 98.4 F | HEIGHT: 67 IN | HEART RATE: 77 BPM | DIASTOLIC BLOOD PRESSURE: 68 MMHG | BODY MASS INDEX: 26.84 KG/M2

## 2018-09-14 DIAGNOSIS — R53.83 FATIGUE, UNSPECIFIED TYPE: ICD-10-CM

## 2018-09-14 DIAGNOSIS — R59.0 SUPRACLAVICULAR LYMPHADENOPATHY: ICD-10-CM

## 2018-09-14 DIAGNOSIS — M67.979 TIBIALIS POSTERIOR TENDINOPATHY: Primary | ICD-10-CM

## 2018-09-14 LAB
T4 FREE SERPL-MCNC: 1.05 NG/DL (ref 0.89–1.76)
TSH SERPL DL<=0.05 MIU/L-ACNC: 0.57 MIU/ML (ref 0.35–5.35)

## 2018-09-14 PROCEDURE — 84443 ASSAY THYROID STIM HORMONE: CPT | Performed by: FAMILY MEDICINE

## 2018-09-14 PROCEDURE — 36415 COLL VENOUS BLD VENIPUNCTURE: CPT | Performed by: FAMILY MEDICINE

## 2018-09-14 PROCEDURE — 84439 ASSAY OF FREE THYROXINE: CPT | Performed by: FAMILY MEDICINE

## 2018-09-14 PROCEDURE — 99214 OFFICE O/P EST MOD 30 MIN: CPT | Performed by: FAMILY MEDICINE

## 2018-09-14 RX ORDER — CEFUROXIME AXETIL 250 MG/1
250 TABLET ORAL 2 TIMES DAILY
Qty: 20 TABLET | Refills: 0 | Status: SHIPPED | OUTPATIENT
Start: 2018-09-14 | End: 2019-02-06

## 2018-09-14 RX ORDER — AZELAIC ACID 0.15 G/G
AEROSOL, FOAM TOPICAL
Refills: 1 | COMMUNITY
Start: 2018-06-20 | End: 2020-06-29

## 2018-09-14 NOTE — PROGRESS NOTES
"Jenny Gonsalez is a 58 y.o. female.     History of Present Illness   The patient is here today with c/o a lump on her neck, Fatigue, Rash on her face and possible food poisoning.    States she went to [place] to the beach and got some food poisoning with vomiting and diarrhea that lasted about a day.  States she noticed the lump on the right side of her neck when vomiting. Also some soreness of the right mandible.    She also c/o a rash on the right side of her nose. States she did have a small bump the ruptured and drained some pus from it about 4 days prior to the vomiting.  Denies any chest pain or shortness of breath.    Feeling tired more and has a family history of Hypothyroidism.    States she would like to get a handicap sticker due to left ankle Impingement syndrome. States she has inserts in both shoes and a brace of the left ankle that she wears daily.    The following portions of the patient's history were reviewed and updated as appropriate: allergies, current medications, past social history and problem list.    Review of Systems   Constitutional: Positive for fatigue.   HENT: Negative for trouble swallowing.         Soreness of the right mandible     Respiratory: Negative for shortness of breath.    Cardiovascular: Negative for chest pain.   Gastrointestinal: Positive for diarrhea, nausea and vomiting.   Musculoskeletal:        Small lump of the right side of neck     Skin: Positive for rash (face).       Objective   /68   Pulse 77   Temp 98.4 °F (36.9 °C) (Tympanic)   Resp 20   Ht 170.2 cm (67\")   Wt 77.6 kg (171 lb)   SpO2 97%   BMI 26.78 kg/m²   Physical Exam   Constitutional: She appears well-developed and well-nourished.   Cardiovascular: Normal rate and regular rhythm.    No murmur heard.  Pulmonary/Chest: Effort normal and breath sounds normal. She has no wheezes. She has no rales.   There is a 1-1/2 cm right supraclavicular mass that is mobile and nontender.  " This could represent a area of adenopathy but also may represent a supraclavicular fat pad.   Nursing note and vitals reviewed.      Assessment/Plan   Problem List Items Addressed This Visit     None      Visit Diagnoses     Tibialis posterior tendinopathy    -  Primary    Fatigue, unspecified type        Supraclavicular lymphadenopathy                  Drink plenty fluids.    Continue medications as doing.    Check a TSH and Free T 4. Report results by letter.    Rx for Cefuroxime 250 mg twice a day #20+0.    Given a Handicap form.    Follow up as needed.                Scribed for Dr Jack Avery by Gogo Sandhu CMA.          I, Jack Avery MD, personally performed the services described in this documentation, as scribed by Gogo Sandhu in my presence, and is both accurate and complete.

## 2018-10-22 ENCOUNTER — OFFICE VISIT (OUTPATIENT)
Dept: FAMILY MEDICINE CLINIC | Facility: CLINIC | Age: 58
End: 2018-10-22

## 2018-10-22 VITALS
BODY MASS INDEX: 26.74 KG/M2 | SYSTOLIC BLOOD PRESSURE: 124 MMHG | DIASTOLIC BLOOD PRESSURE: 64 MMHG | OXYGEN SATURATION: 97 % | TEMPERATURE: 98 F | WEIGHT: 170.4 LBS | HEART RATE: 115 BPM | RESPIRATION RATE: 16 BRPM | HEIGHT: 67 IN

## 2018-10-22 DIAGNOSIS — Z23 NEED FOR VACCINATION: ICD-10-CM

## 2018-10-22 DIAGNOSIS — R22.2 SUPRACLAVICULAR MASS: Primary | ICD-10-CM

## 2018-10-22 PROCEDURE — 90471 IMMUNIZATION ADMIN: CPT | Performed by: FAMILY MEDICINE

## 2018-10-22 PROCEDURE — 99213 OFFICE O/P EST LOW 20 MIN: CPT | Performed by: FAMILY MEDICINE

## 2018-10-22 PROCEDURE — 90686 IIV4 VACC NO PRSV 0.5 ML IM: CPT | Performed by: FAMILY MEDICINE

## 2018-10-22 NOTE — PROGRESS NOTES
"Jenny Gonsalez is a 58 y.o. female.     History of Present Illness   The patient is here today for a recheck on lump of the right side her neck.    States the lump is the same not any bigger and not any smaller.  Denies any chest pain or shortness of breath.    Also wanting to her a flu vaccine today.      The following portions of the patient's history were reviewed and updated as appropriate: allergies, current medications, past social history and problem list.    Review of Systems   Respiratory: Negative for shortness of breath.    Cardiovascular: Negative for chest pain.   Skin:        Supraclavicular mass.       Objective   /64   Pulse 115   Temp 98 °F (36.7 °C) (Temporal Artery )   Resp 16   Ht 170.2 cm (67\")   Wt 77.3 kg (170 lb 6.4 oz)   SpO2 97%   BMI 26.69 kg/m²   Physical Exam   Constitutional: She appears well-developed and well-nourished.   Neck:   Examination of the neck reveals no adenopathy or mass.  At the right super clavicular area however there is say soft prominence in the supraclavicular fossa.  This is nontender and it is very mobile and soft.  This is most consistent with clavicular fat pad.   Cardiovascular: Normal rate, regular rhythm and normal heart sounds.    Pulmonary/Chest: Effort normal and breath sounds normal. She has no wheezes. She has no rales.   Skin: Skin is warm. No rash noted. No erythema.   Nursing note and vitals reviewed.      Assessment/Plan   Problem List Items Addressed This Visit     None      Visit Diagnoses     Supraclavicular mass    -  Primary    Need for vaccination        Relevant Orders    Fluarix/Fluzone/Afluria Quad/FluLaval Quad (Completed)              Drink plenty fluids.    Continue medications as doing.    Check a Chest xray. Report results by letter.    Given a flu vaccine.    Follow up in            Scribed for Dr Jack Avery by Gogo Sandhu CMA.          I, Jack Avery MD, personally performed the services described in " this documentation, as scribed by Gogo Sandhu in my presence, and is both accurate and complete.

## 2018-10-23 ENCOUNTER — HOSPITAL ENCOUNTER (OUTPATIENT)
Dept: GENERAL RADIOLOGY | Facility: HOSPITAL | Age: 58
Discharge: HOME | End: 2018-10-23
Attending: FAMILY MEDICINE | Admitting: FAMILY MEDICINE

## 2018-10-23 ENCOUNTER — TRANSCRIBE ORDERS (OUTPATIENT)
Dept: ADMINISTRATIVE | Facility: HOSPITAL | Age: 58
End: 2018-10-23

## 2018-10-23 DIAGNOSIS — R22.2 SUPRACLAVICULAR MASS: ICD-10-CM

## 2018-10-23 DIAGNOSIS — R22.2 SUPRACLAVICULAR MASS: Primary | ICD-10-CM

## 2018-10-23 PROCEDURE — 71046 X-RAY EXAM CHEST 2 VIEWS: CPT

## 2018-10-23 PROCEDURE — 71045 X-RAY EXAM CHEST 1 VIEW: CPT

## 2018-10-29 ENCOUNTER — TELEPHONE (OUTPATIENT)
Dept: FAMILY MEDICINE CLINIC | Facility: CLINIC | Age: 58
End: 2018-10-29

## 2018-10-29 NOTE — TELEPHONE ENCOUNTER
Spoke to the patient on the telephone and reported that her chest x-ray returned with no evidence of an apical mass.  We will have her observe what likely is a supraclavicular fat pad.

## 2019-01-30 DIAGNOSIS — I10 ESSENTIAL HYPERTENSION: ICD-10-CM

## 2019-01-30 RX ORDER — LOSARTAN POTASSIUM 25 MG/1
25 TABLET ORAL DAILY
Qty: 90 TABLET | Refills: 1 | Status: SHIPPED | OUTPATIENT
Start: 2019-01-30 | End: 2019-07-03 | Stop reason: SDUPTHER

## 2019-02-06 ENCOUNTER — OFFICE VISIT (OUTPATIENT)
Dept: FAMILY MEDICINE CLINIC | Facility: CLINIC | Age: 59
End: 2019-02-06

## 2019-02-06 VITALS
SYSTOLIC BLOOD PRESSURE: 118 MMHG | RESPIRATION RATE: 20 BRPM | DIASTOLIC BLOOD PRESSURE: 70 MMHG | TEMPERATURE: 98.4 F | BODY MASS INDEX: 26.53 KG/M2 | OXYGEN SATURATION: 97 % | HEIGHT: 67 IN | WEIGHT: 169 LBS | HEART RATE: 100 BPM

## 2019-02-06 DIAGNOSIS — H81.13 BENIGN PAROXYSMAL POSITIONAL VERTIGO DUE TO BILATERAL VESTIBULAR DISORDER: Primary | ICD-10-CM

## 2019-02-06 DIAGNOSIS — R11.0 NAUSEA: ICD-10-CM

## 2019-02-06 DIAGNOSIS — J01.90 ACUTE SINUSITIS, RECURRENCE NOT SPECIFIED, UNSPECIFIED LOCATION: ICD-10-CM

## 2019-02-06 PROCEDURE — 99214 OFFICE O/P EST MOD 30 MIN: CPT | Performed by: FAMILY MEDICINE

## 2019-02-06 RX ORDER — PREDNISONE 10 MG/1
TABLET ORAL
Qty: 15 TABLET | Refills: 0 | Status: SHIPPED | OUTPATIENT
Start: 2019-02-06 | End: 2020-06-02

## 2019-02-06 RX ORDER — DIAZEPAM 5 MG/1
5 TABLET ORAL NIGHTLY PRN
Qty: 10 TABLET | Refills: 1
Start: 2019-02-06 | End: 2020-06-02

## 2019-02-06 RX ORDER — ONDANSETRON 4 MG/1
4 TABLET, ORALLY DISINTEGRATING ORAL EVERY 8 HOURS PRN
Qty: 10 TABLET | Refills: 1 | Status: SHIPPED | OUTPATIENT
Start: 2019-02-06 | End: 2020-06-02

## 2019-02-06 RX ORDER — CEFUROXIME AXETIL 250 MG/1
250 TABLET ORAL 2 TIMES DAILY
Qty: 14 TABLET | Refills: 0 | Status: SHIPPED | OUTPATIENT
Start: 2019-02-06 | End: 2020-06-02

## 2019-02-06 NOTE — PROGRESS NOTES
"Jenny Gonsalez is a 58 y.o. female seen today for Dizziness.     Dizziness   This is a new problem. The current episode started in the past 7 days. The problem occurs daily. The problem has been waxing and waning. Associated symptoms include nausea, vertigo and vomiting. Pertinent negatives include no chest pain, chills or fever. The symptoms are aggravated by standing, walking and bending. She has tried rest, position changes and lying down (Valium) for the symptoms. The treatment provided moderate relief.        The following portions of the patient's history were reviewed and updated as appropriate: allergies, current medications, past social history and problem list.    Review of Systems   Constitutional: Negative for chills and fever.   HENT: Positive for ear pain, postnasal drip, sinus pressure and sinus pain.    Respiratory: Negative for shortness of breath.    Cardiovascular: Negative for chest pain.   Gastrointestinal: Positive for nausea and vomiting.   Neurological: Positive for dizziness (vertigo) and vertigo. Negative for syncope.       Objective   /70   Pulse 100   Temp 98.4 °F (36.9 °C) (Temporal)   Resp 20   Ht 170.2 cm (67\")   Wt 76.7 kg (169 lb)   SpO2 97%   BMI 26.47 kg/m²   Physical Exam   Constitutional: She appears well-developed and well-nourished.   HENT:   Right Ear: Tympanic membrane is bulging. Tympanic membrane is not erythematous.   Left Ear: Tympanic membrane is bulging. Tympanic membrane is not erythematous.   Nose: Right sinus exhibits maxillary sinus tenderness. Left sinus exhibits maxillary sinus tenderness.   Mouth/Throat: Oropharynx is clear and moist.   Cardiovascular: Normal rate and regular rhythm.   Pulmonary/Chest: Effort normal and breath sounds normal.   Nursing note and vitals reviewed.      Assessment/Plan   Problem List Items Addressed This Visit        Nervous and Auditory    Benign paroxysmal positional vertigo - Primary    Relevant " Medications    predniSONE (DELTASONE) 10 MG tablet    diazePAM (VALIUM) 5 MG tablet      Other Visit Diagnoses     Acute sinusitis, recurrence not specified, unspecified location        Relevant Medications    cefuroxime (CEFTIN) 250 MG tablet    Other Relevant Orders    Ambulatory Referral to ENT (Otolaryngology)    Nausea        Relevant Medications    ondansetron ODT (ZOFRAN-ODT) 4 MG disintegrating tablet              Drink plenty fluids.    Rx for Cefuroxime 250 mg twice a day 14+0.    RX for Ondansetron 4 mg every 8 hours as needed #10+1.    Rx for Prednisone 10 mg Take  2 daily x 5 days then 1 daily x 5 days. #15+0.    Written Rx for Diazepam 5 mg as needed #10+1.  [facility] # [id number] reviewed.    Refer to ENT.    Follow up as needed.                  Scribed for Dr Jack Avery by Gogo Sandhu CMA.          I, Jack Avery MD, personally performed the services described in this documentation, as scribed by Gogo Sandhu in my presence, and is both accurate and complete.        (Please note that portions of this note were completed with a voice recognition program. Efforts were made to edit the dictations,but occasionally words are mis transcribed.)

## 2019-07-03 DIAGNOSIS — I10 ESSENTIAL HYPERTENSION: ICD-10-CM

## 2019-07-03 RX ORDER — LOSARTAN POTASSIUM 25 MG/1
25 TABLET ORAL DAILY
Qty: 90 TABLET | Refills: 1 | Status: SHIPPED | OUTPATIENT
Start: 2019-07-03 | End: 2020-01-08

## 2020-01-08 DIAGNOSIS — I10 ESSENTIAL HYPERTENSION: ICD-10-CM

## 2020-01-08 RX ORDER — LOSARTAN POTASSIUM 25 MG/1
TABLET ORAL
Qty: 90 TABLET | Refills: 1 | Status: SHIPPED | OUTPATIENT
Start: 2020-01-08 | End: 2020-05-26 | Stop reason: SDUPTHER

## 2020-05-26 DIAGNOSIS — I10 ESSENTIAL HYPERTENSION: ICD-10-CM

## 2020-05-26 NOTE — TELEPHONE ENCOUNTER
Patient called and stated that refills are needed for the following prescription(s):    losartan (COZAAR) 25 MG tablet    Pharmacy: [facility] [place]  PH: [phone]  FX: [phone]    Patient callback: [phone]    Please advise.

## 2020-05-27 RX ORDER — LOSARTAN POTASSIUM 25 MG/1
25 TABLET ORAL DAILY
Qty: 30 TABLET | Refills: 0 | Status: SHIPPED | OUTPATIENT
Start: 2020-05-27 | End: 2020-06-02 | Stop reason: SDUPTHER

## 2020-05-28 ENCOUNTER — TELEPHONE (OUTPATIENT)
Dept: FAMILY MEDICINE CLINIC | Facility: CLINIC | Age: 60
End: 2020-05-28

## 2020-05-28 NOTE — TELEPHONE ENCOUNTER
Pt called and stated the pharmacy states they have not received her refill for losartan (COZAAR) 25 MG tablet and to contact her pcp. Please advise [phone]

## 2020-05-28 NOTE — TELEPHONE ENCOUNTER
Please let the pharmacy and patient know this was sent yesterday for #30+0 she needs appointment for more refills/JHongma

## 2020-06-02 ENCOUNTER — TELEMEDICINE (OUTPATIENT)
Dept: FAMILY MEDICINE CLINIC | Facility: CLINIC | Age: 60
End: 2020-06-02

## 2020-06-02 DIAGNOSIS — I10 ESSENTIAL HYPERTENSION: ICD-10-CM

## 2020-06-02 PROCEDURE — 99214 OFFICE O/P EST MOD 30 MIN: CPT | Performed by: FAMILY MEDICINE

## 2020-06-02 RX ORDER — LOSARTAN POTASSIUM 25 MG/1
25 TABLET ORAL DAILY
Qty: 90 TABLET | Refills: 3 | Status: SHIPPED | OUTPATIENT
Start: 2020-06-02 | End: 2020-06-29

## 2020-06-02 NOTE — PROGRESS NOTES
Subjective   Nicole Gonsalez is a 59 y.o. female seen today for Hypertension.     You have chosen to receive care through a telehealth visit.  Do you consent to use a video/audio connection for your medical care today? Yes    I conducted a video visit with the patient by means of epic and Zoom.  The audio portion of the visit was excellent but the patient was unable to visualize my face although I was able to visualize her nose.  The patient was located at her home and I was located here at my office at [facility].    History of Present Illness     The patient presents with needs for refill of her losartan 25 mg a day which she takes for hypertension.  She states that she is feeling very well.  She is had no difficulty with chest pain or shortness of breath and no edema or angioedema.  The patient states that blood pressure readings at home range between 110-120 systolically and between 55 and 65 diastolically.    She also is on an estrogen patch which is provided to her by her gynecologist Dr. Oli Finnegan.  Otherwise, the patient is taking no medications.    The following portions of the patient's history were reviewed and updated as appropriate: allergies, current medications, past social history and problem list.    Review of Systems   Constitutional: Negative for activity change, appetite change, chills, diaphoresis, fatigue, fever and unexpected weight change.   HENT: Negative for congestion.    Eyes: Negative for visual disturbance.   Respiratory: Negative for cough and shortness of breath.    Cardiovascular: Negative for chest pain, palpitations and leg swelling.   Gastrointestinal: Negative for nausea.   Endocrine: Negative for polyuria.   Genitourinary: Negative for difficulty urinating.   Musculoskeletal: Negative for arthralgias.   Skin: Negative for rash.   Neurological: Negative for dizziness, syncope, numbness and headaches.   Hematological: Does not bruise/bleed easily.   Psychiatric/Behavioral:  Negative for dysphoric mood. The patient is not nervous/anxious.        Objective   There were no vitals taken for this visit.  Physical Exam   Constitutional: She is oriented to person, place, and time. She appears well-developed and well-nourished.   Neurological: She is alert and oriented to person, place, and time.       Assessment/Plan   Problem List Items Addressed This Visit        Cardiovascular and Mediastinum    Hypertension      The patient's hypertension is doing well taking losartan 25 mg a day.  We will refill that medication for 1 year.    I recommended that the patient return to see me here in September for a complete physical examination.  Hopefully she will also have the opportunity at that time to meet her new physician Dr. Kim.    30 minutes were spent in speaking with the patient and and completing her medical record.

## 2020-06-29 ENCOUNTER — OFFICE VISIT (OUTPATIENT)
Dept: FAMILY MEDICINE CLINIC | Facility: CLINIC | Age: 60
End: 2020-06-29

## 2020-06-29 VITALS
OXYGEN SATURATION: 99 % | TEMPERATURE: 98.6 F | RESPIRATION RATE: 16 BRPM | HEART RATE: 70 BPM | DIASTOLIC BLOOD PRESSURE: 88 MMHG | BODY MASS INDEX: 26.12 KG/M2 | SYSTOLIC BLOOD PRESSURE: 160 MMHG | HEIGHT: 67 IN | WEIGHT: 166.4 LBS

## 2020-06-29 DIAGNOSIS — G44.52 NEW DAILY PERSISTENT HEADACHE: ICD-10-CM

## 2020-06-29 DIAGNOSIS — I10 ESSENTIAL HYPERTENSION: Primary | ICD-10-CM

## 2020-06-29 DIAGNOSIS — R04.0 EPISTAXIS: ICD-10-CM

## 2020-06-29 DIAGNOSIS — Z11.59 NEED FOR HEPATITIS C SCREENING TEST: ICD-10-CM

## 2020-06-29 PROCEDURE — 80053 COMPREHEN METABOLIC PANEL: CPT | Performed by: NURSE PRACTITIONER

## 2020-06-29 PROCEDURE — 85025 COMPLETE CBC W/AUTO DIFF WBC: CPT | Performed by: NURSE PRACTITIONER

## 2020-06-29 PROCEDURE — 86803 HEPATITIS C AB TEST: CPT | Performed by: NURSE PRACTITIONER

## 2020-06-29 PROCEDURE — 99214 OFFICE O/P EST MOD 30 MIN: CPT | Performed by: NURSE PRACTITIONER

## 2020-06-29 RX ORDER — CLOBETASOL PROPIONATE 0.46 MG/ML
SOLUTION TOPICAL
COMMUNITY
Start: 2020-06-25 | End: 2021-09-30 | Stop reason: SDUPTHER

## 2020-06-29 RX ORDER — LIDOCAINE HYDROCHLORIDE AND HYDROCORTISONE ACETATE 30; 5 MG/G; MG/G
CREAM RECTAL SEE ADMIN INSTRUCTIONS
COMMUNITY
Start: 2020-06-10 | End: 2021-09-30

## 2020-06-29 RX ORDER — LOSARTAN POTASSIUM 25 MG/1
50 TABLET ORAL DAILY
Qty: 90 TABLET | Refills: 3
Start: 2020-06-29 | End: 2020-07-20 | Stop reason: SDUPTHER

## 2020-06-29 NOTE — PROGRESS NOTES
Subjective   Nicole Gonsalez is a 59 y.o. female.     History of Present Illness  Here with several concerns  1. Thursday saw Dermatologist. Diagnosed with psorriasis.  Treated with steroid drops to scalp.  Had a headache after using it. Saturday had headache and nose bleed and elevated blood pressure. Epistaxis on right. She is worried it could be a side effect of the medication. Mother with RA. Denies joint pain and edema.  2. Has had htn for years. Stable on Losartan. Sister with widowmaker. Both parents with htn. Checks her bp at home and noticed drastic increase to 170/90.  She denies chest pain, palpitations, edema. Compliant with meds.   3. Asks about screening tests. Has not had Hep c antibody test.      Outpatient Encounter Medications as of 6/29/2020   Medication Sig Dispense Refill   • clobetasol (TEMOVATE) 0.05 % external solution      • estradiol (JUSTICE) 0.1 MG/24HR patch Place  on the skin 2 (Two) Times a Week. Sun and wednesday     • Lidocaine-Hydrocortisone Ace (LIDOCAINE-HYDROCORT, PERIANAL,) 3-0.5 % cream rectal cream See Admin Instructions.     • losartan (COZAAR) 25 MG tablet Take 2 tablets by mouth Daily. Needs appointment 90 tablet 3   • [DISCONTINUED] losartan (COZAAR) 25 MG tablet Take 1 tablet by mouth Daily. Needs appointment 90 tablet 3   • [DISCONTINUED] FINACEA 15 % foam 1 GRAM ON SKIN DAILY  1     No facility-administered encounter medications on file as of 6/29/2020.        The following portions of the patient's history were reviewed and updated as appropriate: allergies, current medications, past family history, past medical history, past social history, past surgical history and problem list.    Review of Systems   Constitutional: Negative for appetite change, fever, unexpected weight gain and unexpected weight loss.   HENT: Positive for nosebleeds. Negative for congestion, sore throat and trouble swallowing.    Eyes: Negative for visual disturbance.   Respiratory: Negative for  "cough, shortness of breath and wheezing.    Cardiovascular: Negative for chest pain, palpitations and leg swelling.   Gastrointestinal: Negative for abdominal pain, blood in stool, constipation, diarrhea, nausea and vomiting.   Endocrine: Negative for polydipsia, polyphagia and polyuria.   Genitourinary: Negative for dysuria, frequency and hematuria.   Musculoskeletal: Negative for arthralgias, joint swelling and myalgias.   Skin: Positive for rash.   Neurological: Positive for headache. Negative for dizziness, seizures, syncope and numbness.   Hematological: Negative for adenopathy. Does not bruise/bleed easily.   Psychiatric/Behavioral: Negative for behavioral problems, sleep disturbance and depressed mood. The patient is not nervous/anxious.        Objective     Visit Vitals  /88 (BP Location: Left arm, Patient Position: Sitting, Cuff Size: Adult)   Pulse 70   Temp 98.6 °F (37 °C) (Temporal)   Resp 16   Ht 170.2 cm (67\")   Wt 75.5 kg (166 lb 6.4 oz)   SpO2 99%   BMI 26.06 kg/m²       Physical Exam   Constitutional: She is oriented to person, place, and time. She appears well-developed and well-nourished. No distress.   HENT:   Head: Normocephalic and atraumatic.   Right Ear: Tympanic membrane and external ear normal.   Left Ear: Tympanic membrane and external ear normal.   Nose: Nose normal.   Mouth/Throat: Oropharynx is clear and moist. No oropharyngeal exudate.   Eyes: Pupils are equal, round, and reactive to light. Conjunctivae are normal. Right eye exhibits no discharge. Left eye exhibits no discharge. No scleral icterus.   Neck: Neck supple. No tracheal deviation present. No thyromegaly present.   Cardiovascular: Normal rate, regular rhythm and normal heart sounds. Exam reveals no gallop and no friction rub.   No murmur heard.  Pulmonary/Chest: Effort normal and breath sounds normal. No respiratory distress. She has no wheezes.   Abdominal: Soft. Bowel sounds are normal. She exhibits no distension and " no mass. There is no tenderness.   Musculoskeletal: She exhibits no edema or deformity.   Lymphadenopathy:     She has no cervical adenopathy.   Neurological: She is alert and oriented to person, place, and time. Coordination normal.   Skin: Skin is warm and dry. Capillary refill takes less than 2 seconds. Rash noted. No erythema.   Base of scalp with small 1cm patch of scaling, flaky skin on erythematous base.   Psychiatric: She has a normal mood and affect. Her speech is normal and behavior is normal. Judgment and thought content normal.   Nursing note and vitals reviewed.        Assessment/Plan   Nicole was seen today for hypertension, nose bleed and headache.    Diagnoses and all orders for this visit:    Essential hypertension  -     Comprehensive Metabolic Panel  -     losartan (COZAAR) 25 MG tablet; Take 2 tablets by mouth Daily. Needs appointment    Epistaxis  -     CBC & Differential  -     CBC Auto Differential    Need for hepatitis C screening test  -     Hepatitis C Antibody    New daily persistent headache    I checked her bp in both arms. Higher in left than right but elevated.  Monitor bp at home and bring log. Encourage DASH diet and 150 minutes of weekly exercise. Reviewed signs and symptoms of MI and stroke. If symptoms persist or worsen go to the ER.  Discussed how to manage htn. Increase to 50mg daily and monitor bp once a day and follow up with results in one week.  Check labs today.  Reviewed her HCM and she will have copies of screening scanned into her chart.  Discussed the nature of the disease including, risks, complications, implications, management, safe and proper use of medications. Encouraged therapeutic lifestyle changes including low calorie diet with plenty of fruits and vegetables, daily exercise, medication compliance, and keeping scheduled follow up appointments with me and any other providers. Encouraged patient to have appointment for complete physical, fasting labs,  appropriate screenings, and immunizations on an annual basis.  I personally spent  40 minutes face to face with the patient with 30 spent in counseling and discussion and/or coordination of care as described above for htn, headaches and epistaxis.

## 2020-06-30 LAB
ALBUMIN SERPL-MCNC: 4.3 G/DL (ref 3.5–5.2)
ALBUMIN/GLOB SERPL: 1.4 G/DL
ALP SERPL-CCNC: 62 U/L (ref 39–117)
ALT SERPL W P-5'-P-CCNC: 21 U/L (ref 1–33)
ANION GAP SERPL CALCULATED.3IONS-SCNC: 11 MMOL/L (ref 5–15)
AST SERPL-CCNC: 17 U/L (ref 1–32)
BASOPHILS # BLD AUTO: 0.05 10*3/MM3 (ref 0–0.2)
BASOPHILS NFR BLD AUTO: 0.7 % (ref 0–1.5)
BILIRUB SERPL-MCNC: 0.3 MG/DL (ref 0.2–1.2)
BUN SERPL-MCNC: 8 MG/DL (ref 8–23)
BUN/CREAT SERPL: 10 (ref 7–25)
CALCIUM SPEC-SCNC: 9.4 MG/DL (ref 8.6–10.5)
CHLORIDE SERPL-SCNC: 101 MMOL/L (ref 98–107)
CO2 SERPL-SCNC: 26 MMOL/L (ref 22–29)
CREAT SERPL-MCNC: 0.8 MG/DL (ref 0.57–1)
DEPRECATED RDW RBC AUTO: 39 FL (ref 37–54)
EOSINOPHIL # BLD AUTO: 0.21 10*3/MM3 (ref 0–0.4)
EOSINOPHIL NFR BLD AUTO: 2.9 % (ref 0.3–6.2)
ERYTHROCYTE [DISTWIDTH] IN BLOOD BY AUTOMATED COUNT: 12.2 % (ref 12.3–15.4)
GFR SERPL CREATININE-BSD FRML MDRD: 73 ML/MIN/1.73
GLOBULIN UR ELPH-MCNC: 3.1 GM/DL
GLUCOSE SERPL-MCNC: 85 MG/DL (ref 65–99)
HCT VFR BLD AUTO: 42.1 % (ref 34–46.6)
HCV AB SER DONR QL: NORMAL
HGB BLD-MCNC: 14 G/DL (ref 12–15.9)
IMM GRANULOCYTES # BLD AUTO: 0.03 10*3/MM3 (ref 0–0.05)
IMM GRANULOCYTES NFR BLD AUTO: 0.4 % (ref 0–0.5)
LYMPHOCYTES # BLD AUTO: 2.12 10*3/MM3 (ref 0.7–3.1)
LYMPHOCYTES NFR BLD AUTO: 28.8 % (ref 19.6–45.3)
MCH RBC QN AUTO: 29.2 PG (ref 26.6–33)
MCHC RBC AUTO-ENTMCNC: 33.3 G/DL (ref 31.5–35.7)
MCV RBC AUTO: 87.7 FL (ref 79–97)
MONOCYTES # BLD AUTO: 0.38 10*3/MM3 (ref 0.1–0.9)
MONOCYTES NFR BLD AUTO: 5.2 % (ref 5–12)
NEUTROPHILS NFR BLD AUTO: 4.56 10*3/MM3 (ref 1.7–7)
NEUTROPHILS NFR BLD AUTO: 62 % (ref 42.7–76)
NRBC BLD AUTO-RTO: 0 /100 WBC (ref 0–0.2)
PLATELET # BLD AUTO: 331 10*3/MM3 (ref 140–450)
PMV BLD AUTO: 11.2 FL (ref 6–12)
POTASSIUM SERPL-SCNC: 4.1 MMOL/L (ref 3.5–5.2)
PROT SERPL-MCNC: 7.4 G/DL (ref 6–8.5)
RBC # BLD AUTO: 4.8 10*6/MM3 (ref 3.77–5.28)
SODIUM SERPL-SCNC: 138 MMOL/L (ref 136–145)
WBC # BLD AUTO: 7.35 10*3/MM3 (ref 3.4–10.8)

## 2020-07-02 ENCOUNTER — TELEPHONE (OUTPATIENT)
Dept: FAMILY MEDICINE CLINIC | Facility: CLINIC | Age: 60
End: 2020-07-02

## 2020-07-02 NOTE — TELEPHONE ENCOUNTER
Patient states that she needs Dr. Avery to get records from Dr. Dobbs's office from her stress tests.  She can be reached at [phone]

## 2020-07-20 ENCOUNTER — OFFICE VISIT (OUTPATIENT)
Dept: FAMILY MEDICINE CLINIC | Facility: CLINIC | Age: 60
End: 2020-07-20

## 2020-07-20 VITALS
HEIGHT: 67 IN | BODY MASS INDEX: 25.9 KG/M2 | OXYGEN SATURATION: 95 % | DIASTOLIC BLOOD PRESSURE: 76 MMHG | SYSTOLIC BLOOD PRESSURE: 118 MMHG | WEIGHT: 165 LBS | TEMPERATURE: 98.4 F | HEART RATE: 75 BPM | RESPIRATION RATE: 21 BRPM

## 2020-07-20 DIAGNOSIS — I10 ESSENTIAL HYPERTENSION: ICD-10-CM

## 2020-07-20 LAB
CHOLEST SERPL-MCNC: 171 MG/DL (ref 0–200)
HDLC SERPL-MCNC: 54 MG/DL (ref 40–60)
LDLC SERPL CALC-MCNC: 94 MG/DL (ref 0–100)
LDLC/HDLC SERPL: 1.73 {RATIO}
TRIGL SERPL-MCNC: 117 MG/DL (ref 0–150)
TSH SERPL DL<=0.05 MIU/L-ACNC: 1.14 UIU/ML (ref 0.27–4.2)
VLDLC SERPL-MCNC: 23.4 MG/DL (ref 5–40)

## 2020-07-20 PROCEDURE — 36415 COLL VENOUS BLD VENIPUNCTURE: CPT | Performed by: FAMILY MEDICINE

## 2020-07-20 PROCEDURE — 99213 OFFICE O/P EST LOW 20 MIN: CPT | Performed by: FAMILY MEDICINE

## 2020-07-20 PROCEDURE — 80061 LIPID PANEL: CPT | Performed by: FAMILY MEDICINE

## 2020-07-20 PROCEDURE — 84443 ASSAY THYROID STIM HORMONE: CPT | Performed by: FAMILY MEDICINE

## 2020-07-20 RX ORDER — LOSARTAN POTASSIUM 50 MG/1
50 TABLET ORAL DAILY
Qty: 90 TABLET | Refills: 3 | Status: SHIPPED | OUTPATIENT
Start: 2020-07-20 | End: 2021-08-31 | Stop reason: SDUPTHER

## 2020-07-20 NOTE — PROGRESS NOTES
"Subjective   Nicole Gonsalez is a 60 y.o. female seen today for Hypertension.     History of Present Illness   The patient is here for a follow up on Hypertension.     States she is doing better.  Saw the NP Elia Call on 06/29/2020 and BP was running around 170/90. 160/88 at that visit. She had headaches and had some nose bleeds.she restarted her on Losartan 25 mg 2 tablets daily.  BP today is 118/76. Headaches and nose bleeds have eresolved.  Denies any chest pain or shortness of breath.    The following portions of the patient's history were reviewed and updated as appropriate: allergies, current medications, past social history and problem list.    Review of Systems   Constitutional: Negative for chills and fever.   Respiratory: Negative for cough and shortness of breath.    Cardiovascular: Negative for chest pain.       Objective   /76   Pulse 75   Temp 98.4 °F (36.9 °C)   Resp 21   Ht 170.2 cm (67\")   Wt 74.8 kg (165 lb)   SpO2 95%   BMI 25.84 kg/m²   Physical Exam   Constitutional: She appears well-developed and well-nourished.   Cardiovascular: Normal rate, regular rhythm and normal pulses.   Pulmonary/Chest: Effort normal and breath sounds normal.   Nursing note and vitals reviewed.      Assessment/Plan   Problem List Items Addressed This Visit        Cardiovascular and Mediastinum    Hypertension              Drink plenty fluids.    Continue medications as doing.    Rx for Losartan 50 mg daily #90+1.    Follow up in 2 months for a physical.              Scribed for Dr Jack Avery by Gogo Sandhu CMA.    "

## 2021-08-31 DIAGNOSIS — I10 ESSENTIAL HYPERTENSION: ICD-10-CM

## 2021-08-31 RX ORDER — LOSARTAN POTASSIUM 50 MG/1
50 TABLET ORAL DAILY
Qty: 90 TABLET | Refills: 0 | Status: SHIPPED | OUTPATIENT
Start: 2021-08-31 | End: 2021-09-30 | Stop reason: SDUPTHER

## 2021-09-30 ENCOUNTER — OFFICE VISIT (OUTPATIENT)
Dept: FAMILY MEDICINE CLINIC | Facility: CLINIC | Age: 61
End: 2021-09-30

## 2021-09-30 VITALS
DIASTOLIC BLOOD PRESSURE: 70 MMHG | OXYGEN SATURATION: 96 % | SYSTOLIC BLOOD PRESSURE: 112 MMHG | HEIGHT: 67 IN | BODY MASS INDEX: 26.43 KG/M2 | WEIGHT: 168.4 LBS | RESPIRATION RATE: 16 BRPM | TEMPERATURE: 98 F | HEART RATE: 81 BPM

## 2021-09-30 DIAGNOSIS — J30.1 ALLERGIC RHINITIS DUE TO POLLEN, UNSPECIFIED SEASONALITY: ICD-10-CM

## 2021-09-30 DIAGNOSIS — I10 ESSENTIAL HYPERTENSION: Primary | ICD-10-CM

## 2021-09-30 PROCEDURE — 99214 OFFICE O/P EST MOD 30 MIN: CPT | Performed by: FAMILY MEDICINE

## 2021-09-30 RX ORDER — LORATADINE 10 MG/1
10 TABLET ORAL DAILY
COMMUNITY
End: 2021-09-30 | Stop reason: SDUPTHER

## 2021-09-30 RX ORDER — CLOBETASOL PROPIONATE 0.46 MG/ML
SOLUTION TOPICAL 2 TIMES DAILY
Qty: 50 ML | Refills: 5 | Status: SHIPPED | OUTPATIENT
Start: 2021-09-30 | End: 2022-08-08 | Stop reason: SDUPTHER

## 2021-09-30 RX ORDER — LORATADINE 10 MG/1
10 TABLET ORAL DAILY
Qty: 90 TABLET | Refills: 3 | Status: SHIPPED | OUTPATIENT
Start: 2021-09-30

## 2021-09-30 RX ORDER — LOSARTAN POTASSIUM 50 MG/1
50 TABLET ORAL DAILY
Qty: 90 TABLET | Refills: 3 | Status: SHIPPED | OUTPATIENT
Start: 2021-09-30 | End: 2022-06-27 | Stop reason: SDUPTHER

## 2021-09-30 NOTE — PATIENT INSTRUCTIONS
How to Perform the Epley Maneuver  The Epley maneuver is an exercise that relieves symptoms of vertigo. Vertigo is the feeling that you or your surroundings are moving when they are not. When you feel vertigo, you may feel like the room is spinning and may have trouble walking. The Epley maneuver is used for a type of vertigo caused by a calcium deposit in a part of the inner ear. The maneuver involves changing head positions to help the deposit move out of the area.  You can do this maneuver at home whenever you have symptoms of vertigo. You can repeat it in 24 hours if your vertigo has not gone away.  Even though the Epley maneuver may relieve your vertigo for a few weeks, it is possible that your symptoms will return. This maneuver relieves vertigo, but it does not relieve dizziness.  What are the risks?  If it is done correctly, the Epley maneuver is considered safe. Sometimes it can lead to dizziness or nausea that goes away after a short time. If you develop other symptoms--such as changes in vision, weakness, or numbness--stop doing the maneuver and call your health care provider.  Supplies needed:  · A bed or table.  · A pillow.  How to do the Epley maneuver         1. Sit on the edge of a bed or table with your back straight and your legs extended or hanging over the edge of the bed or table.  2. Turn your head alf toward the affected ear or side as told by your health care provider.  3. Lie backward quickly with your head turned until you are lying flat on your back. You may want to position a pillow under your shoulders.  4. Hold this position for at least 30 seconds. If you feel dizzy or have symptoms of vertigo, continue to hold the position until the symptoms stop.  5. Turn your head to the opposite direction until your unaffected ear is facing the floor.  6. Hold this position for at least 30 seconds. If you feel dizzy or have symptoms of vertigo, continue to hold the position until the symptoms  stop.  7. Turn your whole body to the same side as your head so that you are positioned on your side. Your head will now be nearly facedown. Hold for at least 30 seconds. If you feel dizzy or have symptoms of vertigo, continue to hold the position until the symptoms stop.  8. Sit back up.  You can repeat the maneuver in 24 hours if your vertigo does not go away.  Follow these instructions at home:  For 24 hours after doing the Epley maneuver:  · Keep your head in an upright position.  · When lying down to sleep or rest, keep your head raised (elevated) with two or more pillows.  · Avoid excessive neck movements.  Activity  · Do not drive or use machinery if you feel dizzy.  · After doing the Epley maneuver, return to your normal activities as told by your health care provider. Ask your health care provider what activities are safe for you.  General instructions  · Drink enough fluid to keep your urine pale yellow.  · Do not drink alcohol.  · Take over-the-counter and prescription medicines only as told by your health care provider.  · Keep all follow-up visits as told by your health care provider. This is important.  Preventing vertigo symptoms  Ask your health care provider if there is anything you should do at home to prevent vertigo. He or she may recommend that you:  · Keep your head elevated with two or more pillows while you sleep.  · Do not sleep on the side of your affected ear.  · Get up slowly from bed.  · Avoid sudden movements during the day.  · Avoid extreme head positions or movement, such as looking up or bending over.  Contact a health care provider if:  · Your vertigo gets worse.  · You have other symptoms, including:  ? Nausea.  ? Vomiting.  ? Headache.  Get help right away if you:  · Have vision changes.  · Have a headache or neck pain that is severe or getting worse.  · Cannot stop vomiting.  · Have new numbness or weakness in any part of your body.  Summary  · Vertigo is the feeling that you or  your surroundings are moving when they are not.  · The Epley maneuver is an exercise that relieves symptoms of vertigo.  · If the Epley maneuver is done correctly, it is considered safe and relieves vertigo quickly.  This information is not intended to replace advice given to you by your health care provider. Make sure you discuss any questions you have with your health care provider.  Document Revised: 10/14/2020 Document Reviewed: 10/14/2020  Elsevier Patient Education © 2021 [employer]

## 2021-09-30 NOTE — PROGRESS NOTES
Follow Up Office Visit      Patient Name: Nicole Gonsalez  : 1960   MRN: [id number]     Chief Complaint:    Chief Complaint   Patient presents with   • Establish Care   • Med Refill       History of Present Illness: Nicole Gonsalez is a 61 y.o. female who is here today to follow up with history of hypertension allergic rhinitis.  Patient also has psoriasis that was new onset in the last year.  Patient is on hormone replacement due to her previous hysterectomy.  Patient is followed by gynecology who orders her mammograms and gives her hormone replacement.  Patient is followed by dermatology gives him the compounded medication for her psoriasis and seborrheic dermatitis.    Of note patient has history of vertigo that is severe.  Patient describes 1 event that lasted 45 days and she had multiple ENT doctors try to treat her.          htn-hypertension is controlled.  Patient needs refills today.    Allergic rhinitis-allergic rhinitis and effusions in her ear are controlled with loratadine.  Patient is wondering if she needs to continue it.    Review of systems was positive for skin lesions      Physical exam: Patient's heart and lung exam was normal.  Patient's mood affect was appropriate.          Subjective        I have reviewed and the following portions of the patient's history were updated as appropriate: past family history, past medical history, past social history, past surgical history and problem list.    Medications:     Current Outpatient Medications:   •  clobetasol (TEMOVATE) 0.05 % external solution, Apply  topically to the appropriate area as directed 2 (Two) Times a Day., Disp: 50 mL, Rfl: 5  •  estradiol (JUSTICE) 0.1 MG/24HR patch, Place  on the skin as directed by provider 2 (Two) Times a Week. Sun and Wednesday,  [employer]  please, Disp: , Rfl:   •  loratadine (CLARITIN) 10 MG tablet, Take 1 tablet by mouth Daily., Disp: 90 tablet, Rfl: 3  •  losartan (COZAAR) 50 MG tablet, Take 1  "tablet by mouth Daily. Please don't substitute brand, continue giving pt Aurobindo brand please, Disp: 90 tablet, Rfl: 3    Allergies:   No Known Allergies    Objective     Physical Exam: Please see HPI for physical exam  Vital Signs:   Vitals:    09/30/21 1315   BP: 112/70   Pulse: 81   Resp: 16   Temp: 98 °F (36.7 °C)   TempSrc: Temporal   SpO2: 96%   Weight: 76.4 kg (168 lb 6.4 oz)   Height: 170.2 cm (67\")   PainSc: 0-No pain     Body mass index is 26.38 kg/m².          Assessment / Plan      Assessment/Plan:   Diagnoses and all orders for this visit:    1. Essential hypertension (Primary)  -     losartan (COZAAR) 50 MG tablet; Take 1 tablet by mouth Daily. Please don't substitute brand, continue giving pt Aurobindo brand please  Dispense: 90 tablet; Refill: 3    2. Allergic rhinitis due to pollen, unspecified seasonality  -     loratadine (CLARITIN) 10 MG tablet; Take 1 tablet by mouth Daily.  Dispense: 90 tablet; Refill: 3    Other orders  -     Cancel: Shingrix Vaccine  -     Cancel: Tdap Vaccine Greater Than or Equal To 6yo IM  -     Cancel: Comprehensive Metabolic Panel; Future  -     Cancel: Hemoglobin A1c; Future  -     Cancel: Lipid Panel; Future  -     Cancel: CBC & Differential; Future  -     Cancel: TSH Rfx On Abnormal To Free T4; Future  -     Cancel: Mammo Screening Digital Tomosynthesis Bilateral With CAD; Future  -     clobetasol (TEMOVATE) 0.05 % external solution; Apply  topically to the appropriate area as directed 2 (Two) Times a Day.  Dispense: 50 mL; Refill: 5    Patient has already had an annual exam this year.  Patient had blood work performed and I reviewed it today.  This will be scanned into her chart.  Her A1c was normal.  Patient's kidney and liver function was normal.  Patient is up-to-date with her Tdap.  Patient will get the shingles vaccine at a later date.  Patient can call for the shingles vaccine at earliest convenience.        Patient can call for shingles vaccine, please order " if she wants and she does not need a visit for this    Follow Up:   Return in about 1 year (around 9/30/2022).    Geoffrey Kim,   [facility] [place]

## 2022-06-27 ENCOUNTER — LAB (OUTPATIENT)
Dept: LAB | Facility: HOSPITAL | Age: 62
End: 2022-06-27

## 2022-06-27 ENCOUNTER — OFFICE VISIT (OUTPATIENT)
Dept: FAMILY MEDICINE CLINIC | Facility: CLINIC | Age: 62
End: 2022-06-27

## 2022-06-27 VITALS
BODY MASS INDEX: 26.34 KG/M2 | RESPIRATION RATE: 16 BRPM | SYSTOLIC BLOOD PRESSURE: 120 MMHG | HEART RATE: 76 BPM | WEIGHT: 167.8 LBS | DIASTOLIC BLOOD PRESSURE: 78 MMHG | HEIGHT: 67 IN | OXYGEN SATURATION: 99 % | TEMPERATURE: 98.3 F

## 2022-06-27 DIAGNOSIS — Z12.12 ENCOUNTER FOR COLORECTAL CANCER SCREENING: ICD-10-CM

## 2022-06-27 DIAGNOSIS — Z12.11 ENCOUNTER FOR COLORECTAL CANCER SCREENING: ICD-10-CM

## 2022-06-27 DIAGNOSIS — I10 ESSENTIAL HYPERTENSION: ICD-10-CM

## 2022-06-27 DIAGNOSIS — Z00.00 ANNUAL PHYSICAL EXAM: Primary | ICD-10-CM

## 2022-06-27 LAB
ALBUMIN SERPL-MCNC: 4.4 G/DL (ref 3.5–5.2)
ALBUMIN/GLOB SERPL: 1.7 G/DL
ALP SERPL-CCNC: 72 U/L (ref 39–117)
ALT SERPL W P-5'-P-CCNC: 21 U/L (ref 1–33)
ANION GAP SERPL CALCULATED.3IONS-SCNC: 8.8 MMOL/L (ref 5–15)
AST SERPL-CCNC: 20 U/L (ref 1–32)
BILIRUB SERPL-MCNC: 0.4 MG/DL (ref 0–1.2)
BUN SERPL-MCNC: 10 MG/DL (ref 8–23)
BUN/CREAT SERPL: 13.9 (ref 7–25)
CALCIUM SPEC-SCNC: 10 MG/DL (ref 8.6–10.5)
CHLORIDE SERPL-SCNC: 103 MMOL/L (ref 98–107)
CHOLEST SERPL-MCNC: 175 MG/DL (ref 0–200)
CO2 SERPL-SCNC: 25.2 MMOL/L (ref 22–29)
CREAT SERPL-MCNC: 0.72 MG/DL (ref 0.57–1)
EGFRCR SERPLBLD CKD-EPI 2021: 94.7 ML/MIN/1.73
GLOBULIN UR ELPH-MCNC: 2.6 GM/DL
GLUCOSE SERPL-MCNC: 85 MG/DL (ref 65–99)
HDLC SERPL-MCNC: 49 MG/DL (ref 40–60)
LDLC SERPL CALC-MCNC: 102 MG/DL (ref 0–100)
LDLC/HDLC SERPL: 2.03 {RATIO}
POTASSIUM SERPL-SCNC: 3.9 MMOL/L (ref 3.5–5.2)
PROT SERPL-MCNC: 7 G/DL (ref 6–8.5)
SODIUM SERPL-SCNC: 137 MMOL/L (ref 136–145)
TRIGL SERPL-MCNC: 133 MG/DL (ref 0–150)
TSH SERPL DL<=0.05 MIU/L-ACNC: 1.25 UIU/ML (ref 0.27–4.2)
VLDLC SERPL-MCNC: 24 MG/DL (ref 5–40)

## 2022-06-27 PROCEDURE — 99396 PREV VISIT EST AGE 40-64: CPT | Performed by: FAMILY MEDICINE

## 2022-06-27 PROCEDURE — 80053 COMPREHEN METABOLIC PANEL: CPT | Performed by: FAMILY MEDICINE

## 2022-06-27 PROCEDURE — 99213 OFFICE O/P EST LOW 20 MIN: CPT | Performed by: FAMILY MEDICINE

## 2022-06-27 PROCEDURE — 84443 ASSAY THYROID STIM HORMONE: CPT | Performed by: FAMILY MEDICINE

## 2022-06-27 PROCEDURE — 80061 LIPID PANEL: CPT | Performed by: FAMILY MEDICINE

## 2022-06-27 RX ORDER — LOSARTAN POTASSIUM 50 MG/1
50 TABLET ORAL DAILY
Qty: 90 TABLET | Refills: 3 | Status: SHIPPED | OUTPATIENT
Start: 2022-06-27

## 2022-06-27 NOTE — PROGRESS NOTES
Follow Up Office Visit      Patient Name: Nicole Gonsalez  : 1960   MRN: [id number]     Chief Complaint:    Chief Complaint   Patient presents with   • Annual Exam       History of Present Illness: Nicole Gonsalez is a 61 y.o. female who is here today to follow up with htn that is well controlled. Also needs annual exam.     htn well controlled. No side effects.       Annual exam: Discussed mammogram, vaccines, colorectal cancer screening, and patient is up-to-date with cervical cancer screening because she had a hysterectomy.  Patient was provided with diet information today.  We discussed exercise.  Discussed dental care.      Review of systems was negative for chest pain or shortness of breath      Physical exam: Patient's neurologic exam is grossly intact.  Patient mood and affect was appropriate.  Patient HEENT exam was atraumatic.  Patient lung heart exam was normal without rales rhonchi's or murmurs.  Patient did not have any lower extremity edema.  Patient did not have any joint swelling.  PERRLA.  Oropharynx was without erythema and it was atraumatic.         Subjective        I have reviewed and the following portions of the patient's history were updated as appropriate: past family history, past medical history, past social history, past surgical history and problem list.    Medications:     Current Outpatient Medications:   •  clobetasol (TEMOVATE) 0.05 % external solution, Apply  topically to the appropriate area as directed 2 (Two) Times a Day., Disp: 50 mL, Rfl: 5  •  estradiol (CLIMARA) 0.1 MG/24HR patch, Place 1 patch on the skin as directed by provider 1 (One) Time Per Week. Apply one patch twice weekly, Disp: , Rfl:   •  loratadine (CLARITIN) 10 MG tablet, Take 1 tablet by mouth Daily., Disp: 90 tablet, Rfl: 3  •  losartan (COZAAR) 50 MG tablet, Take 1 tablet by mouth Daily. Please don't substitute brand, continue giving pt Aurobindo brand please, Disp: 90 tablet, Rfl: 3  •  estradiol  "(VIVELLE-DOT) 0.1 MG/24HR patch, Place  on the skin as directed by provider 2 (Two) Times a Week. Sun and Wednesday,  [employer]  please, Disp: , Rfl:   •  NON FORMULARY, Compounded medication- sm4 azelaic acid/metronidazole/ivermectin 15%/1% /1% Patient applies to affected areas on face twice a day- Gets compounded from [place], Disp: , Rfl:     Allergies:   No Known Allergies    Objective     Physical Exam: Please see above  Vital Signs:   Vitals:    06/27/22 1318   BP: 120/78   BP Location: Left arm   Patient Position: Sitting   Cuff Size: Adult   Pulse: 76   Resp: 16   Temp: 98.3 °F (36.8 °C)   TempSrc: Temporal   SpO2: 99%   Weight: 76.1 kg (167 lb 12.8 oz)   Height: 170.2 cm (67\")     Body mass index is 26.28 kg/m².          Assessment / Plan      Assessment/Plan:   Diagnoses and all orders for this visit:    1. Annual physical exam (Primary)  -     Comprehensive Metabolic Panel; Future  -     Lipid Panel; Future  -     TSH Rfx On Abnormal To Free T4; Future  -     Comprehensive Metabolic Panel  -     Lipid Panel  -     TSH Rfx On Abnormal To Free T4    2. Essential hypertension  -     losartan (COZAAR) 50 MG tablet; Take 1 tablet by mouth Daily. Please don't substitute brand, continue giving pt Aurobindo brand please  Dispense: 90 tablet; Refill: 3    3. Encounter for colorectal cancer screening  -     Cancel: Ambulatory Referral to Gastroenterology  -     Ambulatory Referral to Gastroenterology    Annual counseling: Constipation regards to colorectal cancer screening patient agreed to going to gastroenterology for further testing.  Discussed screening labs with the patient.  Discussed diet and exercise and provided her with conchae worksheet.  Discussed Tdap and shingles vaccine patient will come back for these at her preference.       Refill hypertension medication.  Well-controlled.  Continue current treatment.          Follow Up:   Return in about 3 months (around 9/27/2022) for Annual.    Geoffrey" DO Julio  [facility] [place]

## 2022-07-11 ENCOUNTER — TELEPHONE (OUTPATIENT)
Dept: FAMILY MEDICINE CLINIC | Facility: CLINIC | Age: 62
End: 2022-07-11

## 2022-07-11 RX ORDER — BENZONATATE 100 MG/1
100 CAPSULE ORAL 3 TIMES DAILY PRN
Qty: 30 CAPSULE | Refills: 0 | Status: SHIPPED | OUTPATIENT
Start: 2022-07-11

## 2022-07-11 NOTE — TELEPHONE ENCOUNTER
Caller: Nicole Gonsalez    Relationship to patient: Self    Best call back number: [phone]    What is the call regarding:  PATIENT STATES THAT SHE WAS DIAGNOSED WITH COVID ONE WEEK AGO  SHE HAD A FEVER, BUT IT IS GONE, BUT SHE STILL HAS A COUGH.  CAN SHE GET SOME TESSALON PEARLS FOR HER COUGH SENT TO [facility] [place]?

## 2022-08-08 RX ORDER — CLOBETASOL PROPIONATE 0.46 MG/ML
SOLUTION TOPICAL 2 TIMES DAILY
Qty: 50 ML | Refills: 5 | Status: SHIPPED | OUTPATIENT
Start: 2022-08-08 | End: 2022-08-11 | Stop reason: SDUPTHER

## 2022-08-08 NOTE — TELEPHONE ENCOUNTER
Rx Refill Note  Requested Prescriptions     Pending Prescriptions Disp Refills   • clobetasol (TEMOVATE) 0.05 % external solution 50 mL 5     Sig: Apply  topically to the appropriate area as directed 2 (Two) Times a Day.      Last office visit with prescribing clinician: 6/27/2022      Next office visit with prescribing clinician: 6/30/2023            Rafa Delvalle MA  08/08/22, 16:14 EDT

## 2022-08-11 RX ORDER — CLOBETASOL PROPIONATE 0.46 MG/ML
SOLUTION TOPICAL 2 TIMES DAILY
Qty: 50 ML | Refills: 5 | Status: SHIPPED | OUTPATIENT
Start: 2022-08-11

## 2022-08-11 NOTE — TELEPHONE ENCOUNTER
Rx Refill Note  Requested Prescriptions     Pending Prescriptions Disp Refills   • clobetasol (TEMOVATE) 0.05 % external solution 50 mL 5     Sig: Apply  topically to the appropriate area as directed 2 (Two) Times a Day.      Last office visit with prescribing clinician: 6/27/2022      Next office visit with prescribing clinician: 6/30/2023            AJIT MCKEON MA  08/11/22, 12:56 EDT

## 2023-06-16 ENCOUNTER — OFFICE VISIT (OUTPATIENT)
Dept: OBSTETRICS AND GYNECOLOGY | Facility: CLINIC | Age: 63
End: 2023-06-16
Payer: COMMERCIAL

## 2023-06-16 VITALS — WEIGHT: 172.2 LBS | BODY MASS INDEX: 26.97 KG/M2 | SYSTOLIC BLOOD PRESSURE: 128 MMHG | DIASTOLIC BLOOD PRESSURE: 72 MMHG

## 2023-06-16 DIAGNOSIS — Z01.419 ENCOUNTER FOR GYNECOLOGICAL EXAMINATION WITHOUT ABNORMAL FINDING: Primary | ICD-10-CM

## 2023-06-16 DIAGNOSIS — Z79.890 POSTMENOPAUSAL HORMONE REPLACEMENT THERAPY: ICD-10-CM

## 2023-06-16 RX ORDER — LIDOCAINE HYDROCHLORIDE 30 MG/G
CREAM TOPICAL EVERY 4 HOURS PRN
COMMUNITY
End: 2023-06-16 | Stop reason: SDUPTHER

## 2023-06-16 RX ORDER — ESTRADIOL 0.1 MG/D
1 FILM, EXTENDED RELEASE TRANSDERMAL 2 TIMES WEEKLY
COMMUNITY
End: 2023-06-16

## 2023-06-16 RX ORDER — LIDOCAINE HYDROCHLORIDE 30 MG/G
CREAM TOPICAL EVERY 4 HOURS PRN
Qty: 85 G | Refills: 3 | Status: SHIPPED | OUTPATIENT
Start: 2023-06-16

## 2023-06-16 RX ORDER — ESTRADIOL 0.1 MG/D
1 FILM, EXTENDED RELEASE TRANSDERMAL 2 TIMES WEEKLY
Qty: 24 PATCH | Refills: 3 | Status: SHIPPED | OUTPATIENT
Start: 2023-06-19

## 2023-06-16 NOTE — PROGRESS NOTES
Chief Complaint  Nicole Gonsalez is a 62 y.o. No obstetric history on file. female presenting for Annual Exam (C/O no complaints.  NEW GYN, [facility] care.) and Med Refill (Estradiol patch 0.1 mg (#24 with refills), Lidocaine HCl 3%-Hydrocortisone Acetate 0.5% cream (85 gram tube with refills)./[place] [facility] pharmacy.)    History of Present Illness  Nicole is a pleasant 63yo nulligravid woman.  She is new to our department, but has gotten regular gyn care with Dr. Pollo Lea x many years.  She is using transdermal ERT and it relieves all her menopausal symptoms.  She has not tolerated it well in the past when she tried to discontinue or lower the dose.  She verbalizes good understanding of the risks & benefits.  And would like to continue the same transdermal dose.    She has distant PSHx of hysterectomy/BSO.  She had severe endometriosis.  No female cancers.  HTN is well controlled.  Meniere's is under good control for past 2 years.  She has a small hemorrhoid, and has a topical steroid/numbing agent to help with flares.  Dr. Phillip did her last gastoenterology procedure ~3-4 years ago.  She had a narrowing / stenotic area which would not allow for colonoscopy.    We are uncertain of his recommendations for colon cancer screening in the future.  She is willing to sign DANIEL & let us get those records.  She is in a stable & monogamous relationship, and is not at high risk for any vaginal dysplasia.  She is aware that pap is not indicated.    Mammogram is up to date (10/20/2022).  Otherwise, ROS negative.  She has deep spiritual sid.  She is a good friend & neighbor of our MA Coordinator, Agatha.     The following portions of the patient's history were reviewed and updated as appropriate: allergies, current medications, past family history, past medical history, past social history, past surgical history and problem list.    No Known Allergies      Current Outpatient Medications:   •  clobetasol (TEMOVATE) 0.05 %  external solution, Apply  topically to the appropriate area as directed 2 (Two) Times a Day., Disp: 50 mL, Rfl: 5  •  [START ON 6/19/2023] estradiol (VIVELLE-DOT) 0.1 MG/24HR patch, Place 1 patch on the skin as directed by provider 2 (Two) Times a Week. GENERIC patch, Disp: 24 patch, Rfl: 3  •  lidocaine 3 % cream cream, Apply  topically Every 4 (Four) Hours As Needed (flare of hemorrhoid)., Disp: 85 g, Rfl: 3  •  loratadine (CLARITIN) 10 MG tablet, Take 1 tablet by mouth Daily., Disp: 90 tablet, Rfl: 3  •  losartan (COZAAR) 50 MG tablet, Take 1 tablet by mouth Daily. Please don't substitute brand, continue giving pt Aurobindo brand please, Disp: 90 tablet, Rfl: 3    Past Medical History:   Diagnosis Date   • Endometriosis    • Gallstone    • History of medical problems Left ankle has issues; wear a brace.   • Hypertension    • Impingement syndrome of left ankle    • Meniere disease    • Migraine    • PONV (postoperative nausea and vomiting)     requires scopolamine    • Tibialis posterior tendinopathy         Past Surgical History:   Procedure Laterality Date   • CARDIOVASCULAR STRESS TEST     • CHOLECYSTECTOMY     • CHOLECYSTECTOMY WITH INTRAOPERATIVE CHOLANGIOGRAM N/A 5/8/2018    Procedure: CHOLECYSTECTOMY LAPAROSCOPIC INTRAOPERATIVE CHOLANGIOGRAM;  Surgeon: Krunal Sanchez MD;  Location: [facility];  Service: General   • COLON SURGERY      rectal tear repair   • COLONOSCOPY     • CYST REMOVAL      right arm   • DENTAL PROCEDURE      total of 4 times    • DIAGNOSTIC LAPAROSCOPY      multiple    • ENDOSCOPY     • GALLBLADDER SURGERY     • HERNIA REPAIR      umbilical   • HYSTERECTOMY      bso       Objective  /72   Wt 78.1 kg (172 lb 3.2 oz)   BMI 26.97 kg/m²     Physical Exam  Vitals and nursing note reviewed. Exam conducted with a chaperone present.   Constitutional:       General: She is not in acute distress.     Appearance: Normal appearance. She is not ill-appearing.   HENT:      Head:  Normocephalic.   Neck:      Thyroid: No thyroid mass or thyromegaly.   Cardiovascular:      Rate and Rhythm: Normal rate and regular rhythm.      Heart sounds: Normal heart sounds. No murmur heard.  Pulmonary:      Effort: Pulmonary effort is normal. No respiratory distress.      Breath sounds: Normal breath sounds.   Chest:   Breasts:     Right: No inverted nipple, mass or nipple discharge.      Left: No inverted nipple, mass or nipple discharge.      Comments: There is a fullness in the axilla bilaterally (with right > left); no lymphadenopathy noted.    Abdominal:      Palpations: Abdomen is soft. There is no mass.      Tenderness: There is no abdominal tenderness.   Genitourinary:     General: Normal vulva.      Labia:         Right: No rash, tenderness or lesion.         Left: No rash, tenderness or lesion.       Urethra: No prolapse or urethral lesion.      Vagina: No vaginal discharge or erythema.      Uterus: Absent.       Adnexa:         Right: No mass or tenderness.          Left: No mass or tenderness.        Rectum: External hemorrhoid present.      Comments: Anus appears wnl except for one small pink external hemorrhoid.   No rectal exam performed.  Lymphadenopathy:      Upper Body:      Right upper body: No supraclavicular or axillary adenopathy.      Left upper body: No supraclavicular or axillary adenopathy.   Skin:     General: Skin is warm and dry.   Neurological:      Mental Status: She is alert and oriented to person, place, and time.   Psychiatric:         Mood and Affect: Mood normal.         Behavior: Behavior normal.         Assessment/Plan   Diagnoses and all orders for this visit:    1. Encounter for gynecological examination without abnormal finding (Primary)    2. Postmenopausal hormone replacement therapy    Other orders  -     estradiol (VIVELLE-DOT) 0.1 MG/24HR patch; Place 1 patch on the skin as directed by provider 2 (Two) Times a Week. GENERIC patch  Dispense: 24 patch; Refill: 3  -      lidocaine 3 % cream cream; Apply  topically Every 4 (Four) Hours As Needed (flare of hemorrhoid).  Dispense: 85 g; Refill: 3    Couns re: ERT risks & benefits (with risks including, but not limited to blood clots/ DVTs, heart attacks, PE, and strokes.  There is also a theoretical increased risk for breast cancer.  She verbalizes good understanding of these risks.    She is aware that mammogram will be due in late October 2023.      Procedures    40 to 64: Counseling/Anticipatory Guidance Discussed: screenings and self-breast exam    Return in about 1 year (around 6/16/2024) for Annual physical.    Yessica Guevara, APRN  06/16/2023

## 2023-06-29 PROBLEM — Z00.00 ANNUAL PHYSICAL EXAM: Status: ACTIVE | Noted: 2023-06-29

## 2023-06-29 PROBLEM — L71.9 ROSACEA: Status: ACTIVE | Noted: 2023-06-29

## 2023-06-29 PROBLEM — L40.9 PSORIASIS: Status: ACTIVE | Noted: 2023-06-29

## 2023-08-13 DIAGNOSIS — L40.9 PSORIASIS: ICD-10-CM

## 2023-08-14 RX ORDER — CLOBETASOL PROPIONATE 0.46 MG/ML
SOLUTION TOPICAL 2 TIMES DAILY PRN
Qty: 50 ML | Refills: 5 | Status: SHIPPED | OUTPATIENT
Start: 2023-08-14

## 2023-09-13 DIAGNOSIS — L40.9 PSORIASIS: ICD-10-CM

## 2023-09-13 RX ORDER — CLOBETASOL PROPIONATE 0.46 MG/ML
SOLUTION TOPICAL 2 TIMES DAILY PRN
Qty: 50 ML | Refills: 5 | Status: SHIPPED | OUTPATIENT
Start: 2023-09-13

## 2023-09-28 ENCOUNTER — TELEPHONE (OUTPATIENT)
Dept: OBSTETRICS AND GYNECOLOGY | Facility: CLINIC | Age: 63
End: 2023-09-28
Payer: COMMERCIAL

## 2023-09-28 ENCOUNTER — TRANSCRIBE ORDERS (OUTPATIENT)
Dept: ADMINISTRATIVE | Facility: HOSPITAL | Age: 63
End: 2023-09-28
Payer: COMMERCIAL

## 2023-09-28 DIAGNOSIS — Z12.11 SCREENING FOR COLON CANCER: Primary | ICD-10-CM

## 2023-09-28 DIAGNOSIS — Z12.31 SCREENING MAMMOGRAM FOR BREAST CANCER: Primary | ICD-10-CM

## 2023-09-28 RX ORDER — ESTRADIOL 0.1 MG/D
1 FILM, EXTENDED RELEASE TRANSDERMAL 2 TIMES WEEKLY
Qty: 24 PATCH | Refills: 2 | Status: SHIPPED | OUTPATIENT
Start: 2023-09-28

## 2023-09-28 NOTE — TELEPHONE ENCOUNTER
"Nicole is calling to get refills on her hormone patch, she states they are still getting them from Dr. Lea but she is sure they will run out soon. Her annual was 6/23.  She is also asking to have the \"[employer]\" Cologuard done and wants the information.    "

## 2023-10-09 DIAGNOSIS — L40.9 PSORIASIS: ICD-10-CM

## 2023-10-09 RX ORDER — CLOBETASOL PROPIONATE 0.46 MG/ML
SOLUTION TOPICAL 2 TIMES DAILY PRN
Qty: 50 ML | Refills: 5 | Status: SHIPPED | OUTPATIENT
Start: 2023-10-09

## 2023-11-06 DIAGNOSIS — L40.9 PSORIASIS: ICD-10-CM

## 2023-11-06 RX ORDER — CLOBETASOL PROPIONATE 0.46 MG/ML
SOLUTION TOPICAL 2 TIMES DAILY PRN
Qty: 50 ML | Refills: 5 | Status: SHIPPED | OUTPATIENT
Start: 2023-11-06

## 2023-11-27 ENCOUNTER — TELEPHONE (OUTPATIENT)
Dept: OBSTETRICS AND GYNECOLOGY | Facility: CLINIC | Age: 63
End: 2023-11-27
Payer: COMMERCIAL

## 2023-11-27 RX ORDER — LIDOCAINE HYDROCHLORIDE AND HYDROCORTISONE ACETATE 30; 5 MG/G; MG/G
CREAM RECTAL
Qty: 1 EACH | Refills: 2 | Status: SHIPPED | OUTPATIENT
Start: 2023-11-27

## 2023-12-11 ENCOUNTER — TELEPHONE (OUTPATIENT)
Dept: FAMILY MEDICINE CLINIC | Facility: CLINIC | Age: 63
End: 2023-12-11

## 2023-12-11 NOTE — TELEPHONE ENCOUNTER
Caller: Nicole Gonsalez    Relationship: Self    Best call back number:      What is the best time to reach you: ANYTIME    Who are you requesting to speak with (clinical staff, provider,  specific staff member): CLINICAL STAFF    Do you know the name of the person who called: NICOLE    What was the call regarding: PATIENT IS ASKING IF SHE NEEDS TO FAST FOR HER 120642 APPT ?     Is it okay if the provider responds through MyChart:YES

## 2023-12-12 ENCOUNTER — TELEPHONE (OUTPATIENT)
Dept: FAMILY MEDICINE CLINIC | Facility: CLINIC | Age: 63
End: 2023-12-12
Payer: COMMERCIAL

## 2023-12-12 NOTE — TELEPHONE ENCOUNTER
HUB TO RELAY    LEFT VM AND INFORMED PT THAT SHE DOES NEED TO FAST FOR HER APPOINTMENT. LEFT PHONE # [phone] TO CALL IF SHE HAD ANY FURTHER QUESTIONS.

## 2023-12-12 NOTE — TELEPHONE ENCOUNTER
LEFT VM AND INFORMED PT THAT SHE DOES NEED TO FAST FOR HER APPOINTMENT. LEFT PHONE # [phone] TO CALL IF SHE HAD ANY FURTHER QUESTIONS.

## 2023-12-20 DIAGNOSIS — L40.9 PSORIASIS: ICD-10-CM

## 2023-12-20 RX ORDER — CLOBETASOL PROPIONATE 0.46 MG/ML
SOLUTION TOPICAL 2 TIMES DAILY PRN
Qty: 50 ML | Refills: 5 | Status: SHIPPED | OUTPATIENT
Start: 2023-12-20

## 2024-02-22 RX ORDER — ESTRADIOL 0.1 MG/D
1 FILM, EXTENDED RELEASE TRANSDERMAL 2 TIMES WEEKLY
Qty: 24 PATCH | Refills: 1 | Status: SHIPPED | OUTPATIENT
Start: 2024-02-22

## 2024-02-22 NOTE — TELEPHONE ENCOUNTER
Rx Refill Note  Fax refill request received from the pharmacy.  Appointment with Morenita 6/18/24.  Requested Prescriptions      No prescriptions requested or ordered in this encounter      Last office visit with prescribing clinician: 6/16/2023   Last telemedicine visit with prescribing clinician: Visit date not found   Next office visit with prescribing clinician: 6/18/2024                         Would you like a call back once the refill request has been completed: [] Yes [] No    If the office needs to give you a call back, can they leave a voicemail: [] Yes [] No    Opal Leos MA  02/22/24, 16:36 EST

## 2024-04-23 ENCOUNTER — TELEPHONE (OUTPATIENT)
Dept: OBSTETRICS AND GYNECOLOGY | Facility: CLINIC | Age: 64
End: 2024-04-23
Payer: COMMERCIAL

## 2024-04-23 DIAGNOSIS — K64.4 EXTERNAL HEMORRHOIDS: Primary | ICD-10-CM

## 2024-04-23 RX ORDER — LIDOCAINE HYDROCHLORIDE AND HYDROCORTISONE ACETATE 30; 5 MG/G; MG/G
CREAM RECTAL
Qty: 1 EACH | Refills: 2 | Status: SHIPPED | OUTPATIENT
Start: 2024-04-23

## 2024-04-23 RX ORDER — LIDOCAINE HYDROCHLORIDE AND HYDROCORTISONE ACETATE 30; 5 MG/G; MG/G
CREAM RECTAL
Qty: 1 EACH | Refills: 2 | Status: SHIPPED | OUTPATIENT
Start: 2024-04-23 | End: 2024-04-23 | Stop reason: SDUPTHER

## 2024-07-02 DIAGNOSIS — I10 ESSENTIAL HYPERTENSION: ICD-10-CM

## 2024-07-02 NOTE — TELEPHONE ENCOUNTER
PT SCHEDULED 08/16/2024        Rx Refill Note  Requested Prescriptions     Pending Prescriptions Disp Refills    losartan (COZAAR) 50 MG tablet 90 tablet 3     Sig: Take 1 tablet by mouth Daily. Please don't substitute brand, continue giving pt Aurobindo brand please      Last office visit with prescribing clinician: Visit date not found   Last telemedicine visit with prescribing clinician: Visit date not found   Next office visit with prescribing clinician: Visit date not found                         Would you like a call back once the refill request has been completed: [] Yes [] No    If the office needs to give you a call back, can they leave a voicemail: [] Yes [] No    Layne Villar MA  07/02/24, 15:18 EDT

## 2024-07-02 NOTE — TELEPHONE ENCOUNTER
Caller: Wallace Nicole L    Relationship: Self    Best call back number: [phone]     Requested Prescriptions:   Requested Prescriptions     Pending Prescriptions Disp Refills    losartan (COZAAR) 50 MG tablet 90 tablet 3     Sig: Take 1 tablet by mouth Daily. Please don't substitute brand, continue giving pt Aurobindo brand please        Pharmacy where request should be sent: [facility] [place] - [address] [facility] AT PORTAL TO REGISTERED [facility] - [phone] [facility] [phone] FX     Last office visit with prescribing clinician: Visit date not found   Last telemedicine visit with prescribing clinician: Visit date not found   Next office visit with prescribing clinician: Visit date not found     Additional details provided by patient: PATIENT NEEDS TO BE FROM  [employer] ALERT MUST BE WRITTEN ON REQUEST. PATIENT'S [facility]AN IS PHONE [phone] AND [phone].    Does the patient have less than a 3 day supply:  [x] Yes  [] No    Would you like a call back once the refill request has been completed: [] Yes [x] No    If the office needs to give you a call back, can they leave a voicemail: [] Yes [x] No    Eduardo Forrest Rep   07/02/24 14:24 EDT

## 2024-07-03 RX ORDER — LOSARTAN POTASSIUM 50 MG/1
50 TABLET ORAL DAILY
Qty: 30 TABLET | Refills: 0 | Status: SHIPPED | OUTPATIENT
Start: 2024-07-03

## 2024-07-05 DIAGNOSIS — I10 ESSENTIAL HYPERTENSION: ICD-10-CM

## 2024-07-05 DIAGNOSIS — L40.9 PSORIASIS: ICD-10-CM

## 2024-07-05 NOTE — TELEPHONE ENCOUNTER
Rx Refill Note  Requested Prescriptions     Pending Prescriptions Disp Refills    clobetasol (TEMOVATE) 0.05 % external solution 50 mL 5     Sig: Apply  topically to the appropriate area as directed 2 (Two) Times a Day As Needed (psoriasis).    losartan (COZAAR) 50 MG tablet 30 tablet 0     Sig: Take 1 tablet by mouth Daily. Follow up for further refills      Last office visit with prescribing clinician: Visit date not found   Last telemedicine visit with prescribing clinician: Visit date not found   Next office visit with prescribing clinician: Visit date not found                         Would you like a call back once the refill request has been completed: [] Yes [] No    If the office needs to give you a call back, can they leave a voicemail: [] Yes [] No    Layne Villar MA  07/05/24, 10:52 EDT

## 2024-07-08 RX ORDER — CLOBETASOL PROPIONATE 0.46 MG/ML
SOLUTION TOPICAL 2 TIMES DAILY PRN
Qty: 50 ML | Refills: 5 | Status: SHIPPED | OUTPATIENT
Start: 2024-07-08

## 2024-07-08 RX ORDER — LOSARTAN POTASSIUM 50 MG/1
50 TABLET ORAL DAILY
Qty: 30 TABLET | Refills: 0 | Status: CANCELLED | OUTPATIENT
Start: 2024-07-08

## 2024-07-08 NOTE — TELEPHONE ENCOUNTER
Called and spoke with pharmacy, they do not need anything from us they are just needing to order the medication from their supplier. Patient notified.

## 2024-07-08 NOTE — TELEPHONE ENCOUNTER
Caller: Shayla Gonsalez    Relationship: Self    Best call back number: [phone]    Which medication are you concerned about: LOSARTAN    What are your concerns: SHAYLA SAYS JESSICA IS ASKING YOU TO CALL THEM ABOUT THE LOSARTAN [phone].  JESSICA SAYS THEY HAVE FAXED AND SENT THE OFFICE MESSAGES.  SHAYLA HAS   12 DAYS LEFT.      Relationship: Self     Best call back number: [phone]      Requested Prescriptions:   Requested Prescriptions             Pending Prescriptions Disp Refills    losartan (COZAAR) 50 MG tablet 90 tablet 3       Sig: Take 1 tablet by mouth Daily. Please don't substitute brand, continue giving pt Aurobindo brand please         Pharmacy where request should be sent: [facility] - [place] - [address] [facility] AT PORTAL TO REGISTERED [facility] - [phone] [facility] [phone]       Last office visit with prescribing clinician: Visit date not found   Last telemedicine visit with prescribing clinician: Visit date not found   Next office visit with prescribing clinician: Visit date not found      Additional details provided by patient: PATIENT NEEDS TO BE FROM  [employer] ALERT MUST BE WRITTEN ON REQUEST. PATIENT'S [facility] PHARMACY PLAN IS PHONE [phone] AND [phone].     Does the patient have less than a 3 day supply:  [x] Yes  [] No     Would you like a call back once the refill request has been completed: [] Yes [x] No     If the office needs to give you a call back, can they leave a voicemail: [] Yes [x] No

## 2024-07-27 ENCOUNTER — HOSPITAL ENCOUNTER (EMERGENCY)
Facility: HOSPITAL | Age: 64
Discharge: HOME | End: 2024-07-27
Attending: EMERGENCY MEDICINE
Payer: MEDICARE

## 2024-07-27 ENCOUNTER — APPOINTMENT (OUTPATIENT)
Dept: CT IMAGING | Facility: HOSPITAL | Age: 64
End: 2024-07-27
Payer: MEDICARE

## 2024-07-27 ENCOUNTER — APPOINTMENT (OUTPATIENT)
Dept: MRI IMAGING | Facility: HOSPITAL | Age: 64
End: 2024-07-27
Payer: MEDICARE

## 2024-07-27 VITALS
HEIGHT: 67 IN | SYSTOLIC BLOOD PRESSURE: 132 MMHG | TEMPERATURE: 97.9 F | BODY MASS INDEX: 25.9 KG/M2 | DIASTOLIC BLOOD PRESSURE: 92 MMHG | WEIGHT: 165 LBS | RESPIRATION RATE: 18 BRPM | HEART RATE: 78 BPM | OXYGEN SATURATION: 97 %

## 2024-07-27 DIAGNOSIS — G93.89 BRAIN MASS: ICD-10-CM

## 2024-07-27 DIAGNOSIS — R51.9 ACUTE NONINTRACTABLE HEADACHE, UNSPECIFIED HEADACHE TYPE: Primary | ICD-10-CM

## 2024-07-27 LAB
ALBUMIN SERPL-MCNC: 4 G/DL (ref 3.5–5.2)
ALBUMIN/GLOB SERPL: 1.3 G/DL
ALP SERPL-CCNC: 67 U/L (ref 39–117)
ALT SERPL W P-5'-P-CCNC: 20 U/L (ref 1–33)
ANION GAP SERPL CALCULATED.3IONS-SCNC: 9 MMOL/L (ref 5–15)
AST SERPL-CCNC: 18 U/L (ref 1–32)
BASOPHILS # BLD AUTO: 0.06 10*3/MM3 (ref 0–0.2)
BASOPHILS NFR BLD AUTO: 0.8 % (ref 0–1.5)
BILIRUB SERPL-MCNC: 0.4 MG/DL (ref 0–1.2)
BUN SERPL-MCNC: 10 MG/DL (ref 8–23)
BUN/CREAT SERPL: 12.3 (ref 7–25)
CALCIUM SPEC-SCNC: 9.2 MG/DL (ref 8.6–10.5)
CHLORIDE SERPL-SCNC: 104 MMOL/L (ref 98–107)
CO2 SERPL-SCNC: 26 MMOL/L (ref 22–29)
CREAT BLDA-MCNC: 0.8 MG/DL (ref 0.6–1.3)
CREAT BLDA-MCNC: 0.8 MG/DL (ref 0.6–1.3)
CREAT SERPL-MCNC: 0.81 MG/DL (ref 0.57–1)
DEPRECATED RDW RBC AUTO: 41.2 FL (ref 37–54)
EGFRCR SERPLBLD CKD-EPI 2021: 80.7 ML/MIN/1.73
EOSINOPHIL # BLD AUTO: 0.19 10*3/MM3 (ref 0–0.4)
EOSINOPHIL NFR BLD AUTO: 2.6 % (ref 0.3–6.2)
ERYTHROCYTE [DISTWIDTH] IN BLOOD BY AUTOMATED COUNT: 12.6 % (ref 12.3–15.4)
GLOBULIN UR ELPH-MCNC: 3.2 GM/DL
GLUCOSE SERPL-MCNC: 101 MG/DL (ref 65–99)
HCT VFR BLD AUTO: 45.3 % (ref 34–46.6)
HGB BLD-MCNC: 14.9 G/DL (ref 12–15.9)
HOLD SPECIMEN: NORMAL
IMM GRANULOCYTES # BLD AUTO: 0.04 10*3/MM3 (ref 0–0.05)
IMM GRANULOCYTES NFR BLD AUTO: 0.5 % (ref 0–0.5)
LYMPHOCYTES # BLD AUTO: 1.79 10*3/MM3 (ref 0.7–3.1)
LYMPHOCYTES NFR BLD AUTO: 24.5 % (ref 19.6–45.3)
MCH RBC QN AUTO: 29.4 PG (ref 26.6–33)
MCHC RBC AUTO-ENTMCNC: 32.9 G/DL (ref 31.5–35.7)
MCV RBC AUTO: 89.3 FL (ref 79–97)
MONOCYTES # BLD AUTO: 0.38 10*3/MM3 (ref 0.1–0.9)
MONOCYTES NFR BLD AUTO: 5.2 % (ref 5–12)
NEUTROPHILS NFR BLD AUTO: 4.84 10*3/MM3 (ref 1.7–7)
NEUTROPHILS NFR BLD AUTO: 66.4 % (ref 42.7–76)
NRBC BLD AUTO-RTO: 0 /100 WBC (ref 0–0.2)
PLATELET # BLD AUTO: 345 10*3/MM3 (ref 140–450)
PMV BLD AUTO: 10.5 FL (ref 6–12)
POTASSIUM SERPL-SCNC: 4.3 MMOL/L (ref 3.5–5.2)
PROT SERPL-MCNC: 7.2 G/DL (ref 6–8.5)
RBC # BLD AUTO: 5.07 10*6/MM3 (ref 3.77–5.28)
SODIUM SERPL-SCNC: 139 MMOL/L (ref 136–145)
WBC NRBC COR # BLD AUTO: 7.3 10*3/MM3 (ref 3.4–10.8)
WHOLE BLOOD HOLD COAG: NORMAL
WHOLE BLOOD HOLD SPECIMEN: NORMAL

## 2024-07-27 PROCEDURE — 96374 THER/PROPH/DIAG INJ IV PUSH: CPT

## 2024-07-27 PROCEDURE — 70496 CT ANGIOGRAPHY HEAD: CPT

## 2024-07-27 PROCEDURE — 70450 CT HEAD/BRAIN W/O DYE: CPT

## 2024-07-27 PROCEDURE — 96375 TX/PRO/DX INJ NEW DRUG ADDON: CPT

## 2024-07-27 PROCEDURE — 85025 COMPLETE CBC W/AUTO DIFF WBC: CPT | Performed by: PHYSICIAN ASSISTANT

## 2024-07-27 PROCEDURE — 70553 MRI BRAIN STEM W/O & W/DYE: CPT

## 2024-07-27 PROCEDURE — 80053 COMPREHEN METABOLIC PANEL: CPT | Performed by: PHYSICIAN ASSISTANT

## 2024-07-27 PROCEDURE — 0 GADOBENATE DIMEGLUMINE 529 MG/ML SOLUTION: Performed by: EMERGENCY MEDICINE

## 2024-07-27 PROCEDURE — 25010000002 PROCHLORPERAZINE 10 MG/2ML SOLUTION: Performed by: PHYSICIAN ASSISTANT

## 2024-07-27 PROCEDURE — 25010000002 KETOROLAC TROMETHAMINE PER 15 MG: Performed by: PHYSICIAN ASSISTANT

## 2024-07-27 PROCEDURE — 82565 ASSAY OF CREATININE: CPT

## 2024-07-27 PROCEDURE — 99285 EMERGENCY DEPT VISIT HI MDM: CPT

## 2024-07-27 PROCEDURE — A9577 INJ MULTIHANCE: HCPCS | Performed by: EMERGENCY MEDICINE

## 2024-07-27 PROCEDURE — 25510000001 IOPAMIDOL PER 1 ML: Performed by: EMERGENCY MEDICINE

## 2024-07-27 RX ORDER — LORATADINE 10 MG/1
10 CAPSULE, LIQUID FILLED ORAL DAILY PRN
COMMUNITY

## 2024-07-27 RX ORDER — SUMATRIPTAN 50 MG/1
50 TABLET, FILM COATED ORAL
Qty: 4 TABLET | Refills: 0 | Status: SHIPPED | OUTPATIENT
Start: 2024-07-27

## 2024-07-27 RX ORDER — SODIUM CHLORIDE 0.9 % (FLUSH) 0.9 %
10 SYRINGE (ML) INJECTION AS NEEDED
Status: DISCONTINUED | OUTPATIENT
Start: 2024-07-27 | End: 2024-07-27 | Stop reason: HOSPADM

## 2024-07-27 RX ORDER — PROCHLORPERAZINE EDISYLATE 5 MG/ML
5 INJECTION INTRAMUSCULAR; INTRAVENOUS EVERY 6 HOURS PRN
Status: DISCONTINUED | OUTPATIENT
Start: 2024-07-27 | End: 2024-07-27 | Stop reason: HOSPADM

## 2024-07-27 RX ORDER — KETOROLAC TROMETHAMINE 30 MG/ML
15 INJECTION, SOLUTION INTRAMUSCULAR; INTRAVENOUS ONCE
Status: COMPLETED | OUTPATIENT
Start: 2024-07-27 | End: 2024-07-27

## 2024-07-27 RX ADMIN — IOPAMIDOL 80 ML: 755 INJECTION, SOLUTION INTRAVENOUS at 12:50

## 2024-07-27 RX ADMIN — PROCHLORPERAZINE EDISYLATE 5 MG: 5 INJECTION INTRAMUSCULAR; INTRAVENOUS at 13:40

## 2024-07-27 RX ADMIN — GADOBENATE DIMEGLUMINE 15 ML: 529 INJECTION, SOLUTION INTRAVENOUS at 14:44

## 2024-07-27 RX ADMIN — KETOROLAC TROMETHAMINE 15 MG: 30 INJECTION, SOLUTION INTRAMUSCULAR; INTRAVENOUS at 16:26

## 2024-07-27 NOTE — ED PROVIDER NOTES
Subjective   History of Present Illness  64-year-old female presents emergency department today with a headache.  Patient reports has a history of migraine headaches and this feels very different.  She also has a history of Ménière's disease and states she has chronic dizziness does not does not seem to be any worse.  She had no fevers no chills.  She states that her left eye seems like she had little change in the vision but not her normal scotomas.  She states that she had no numbness or tingling no weakness or loss of sensation or strength.  Denies any discoordination.  She has had no fevers no chills no neck pain associated with this.  And nothing really seems to exacerbate the pain she does take over-the-counter Tylenol which seems to give her a little relief but then the headache returns.  As stated she does have a history of migraine headaches but it has been a while since she has had a migraine probably more than a year and this seems very different.    History provided by:  Patient   used: No    Headache  Pain location:  L temporal and L parietal  Quality:  Dull  Radiates to:  Does not radiate  Severity at highest:  8/10  Onset quality:  Gradual  Duration:  2 days  Timing:  Constant  Progression:  Waxing and waning  Chronicity:  New  Similar to prior headaches: no    Context: not activity, not exposure to bright light, not caffeine, not eating, not stress, not exposure to cold air, not intercourse, not loud noise and not straining    Relieved by:  Nothing  Worsened by:  Nothing  Ineffective treatments:  Acetaminophen  Associated symptoms: blurred vision    Associated symptoms: no abdominal pain, no congestion, no diarrhea, no dizziness, no drainage, no ear pain, no facial pain, no fatigue, no fever, no focal weakness, no myalgias, no neck pain, no numbness, no paresthesias, no photophobia, no seizures, no sinus pressure, no swollen glands, no tingling, no URI, no visual change, no  vomiting and no weakness        Review of Systems   Constitutional:  Negative for fatigue and fever.   HENT:  Negative for congestion, ear pain, postnasal drip and sinus pressure.    Eyes:  Positive for blurred vision. Negative for photophobia.   Respiratory:  Negative for chest tightness, shortness of breath and wheezing.    Cardiovascular:  Negative for chest pain and palpitations.   Gastrointestinal:  Negative for abdominal pain, diarrhea and vomiting.   Musculoskeletal:  Negative for myalgias and neck pain.   Neurological:  Positive for headaches. Negative for dizziness, focal weakness, seizures, weakness, numbness and paresthesias.       Past Medical History:   Diagnosis Date    Endometriosis     Gallstone     History of medical problems Left ankle has issues; wear a brace.    Hypertension     Impingement syndrome of left ankle     Meniere disease     Migraine     PONV (postoperative nausea and vomiting)     requires scopolamine     Tibialis posterior tendinopathy        No Known Allergies    Past Surgical History:   Procedure Laterality Date    CARDIOVASCULAR STRESS TEST      CHOLECYSTECTOMY      CHOLECYSTECTOMY WITH INTRAOPERATIVE CHOLANGIOGRAM N/A 2018    Procedure: CHOLECYSTECTOMY LAPAROSCOPIC INTRAOPERATIVE CHOLANGIOGRAM;  Surgeon: Krunal Sanchez MD;  Location: [facility];  Service: General    COLON SURGERY      rectal tear repair    COLONOSCOPY      CYST REMOVAL      right arm    DENTAL PROCEDURE      total of 4 times     DIAGNOSTIC LAPAROSCOPY      multiple     ENDOSCOPY      GALLBLADDER SURGERY      HERNIA REPAIR      umbilical    HYSTERECTOMY      bso       Family History   Problem Relation Age of Onset    Hypertension Mother     Heart disease Mother             Breast cancer Mother     Cancer Mother         Breast cancer;     Hypertension Father     Heart disease Father             Heart disease Sister          Maker heart attack and stent       Social History      Socioeconomic History    Marital status:    Tobacco Use    Smoking status: Never    Smokeless tobacco: Never   Vaping Use    Vaping status: Never Used   Substance and Sexual Activity    Alcohol use: Never    Drug use: Never    Sexual activity: Yes     Partners: Male     Birth control/protection: None     Comment:            Objective   Physical Exam  Vitals and nursing note reviewed.   Constitutional:       General: She is not in acute distress.     Appearance: She is well-developed. She is not diaphoretic.   HENT:      Head: Normocephalic and atraumatic.      Nose: Nose normal. No congestion or rhinorrhea.      Mouth/Throat:      Mouth: Mucous membranes are moist.   Eyes:      General: No scleral icterus.     Extraocular Movements: Extraocular movements intact.      Conjunctiva/sclera: Conjunctivae normal.      Pupils: Pupils are equal, round, and reactive to light.   Cardiovascular:      Rate and Rhythm: Normal rate and regular rhythm.      Heart sounds: Normal heart sounds. No murmur heard.  Pulmonary:      Effort: Pulmonary effort is normal. No respiratory distress.      Breath sounds: Normal breath sounds.   Abdominal:      General: Bowel sounds are normal.      Palpations: Abdomen is soft.      Tenderness: There is no abdominal tenderness.   Musculoskeletal:         General: Normal range of motion.      Cervical back: Normal range of motion and neck supple.   Skin:     General: Skin is warm and dry.   Neurological:      Mental Status: She is alert and oriented to person, place, and time.   Psychiatric:         Behavior: Behavior normal.         Procedures           ED Course  ED Course as of 07/27/24 1618   Sat Jul 27, 2024   1327 Hypodense mass seen on the right temple area.  I discussed this with  and Mrs. Gonsalez.  They are agreeable to an MRI with and without contrast.  Will give a trial of Compazine for nausea.  She is uses in the past this made her somewhat sedated. [HANNAH]   2533  Discussed with Dr. Mendoza neurosurgery on-call.  He has reviewed the films and feels like this is probably chronic.  Was an incidental finding not related to the headache with the symptoms given.  Headache is improved with the Compazine she would like a little bit of Toradol prior to being discharged.  Dr. Mendoza felt likely to be appropriate for the patient to follow-up in office in a couple weeks.  She states that she feels much improved would like to be discharged home. [HANNAH]      ED Course User Index  [HANNAH] Raffaele Leal PA                                             Medical Decision Making  Amount and/or Complexity of Data Reviewed  Labs: ordered.  Radiology: ordered.    Risk  Prescription drug management.        Final diagnoses:   None       ED Disposition  ED Disposition       None            No follow-up provider specified.       Medication List      No changes were made to your prescriptions during this visit.            Raffaele Leal PA  07/28/24 1015

## 2024-08-01 ENCOUNTER — OFFICE VISIT (OUTPATIENT)
Dept: NEUROSURGERY | Facility: CLINIC | Age: 64
End: 2024-08-01
Payer: MEDICARE

## 2024-08-01 VITALS — WEIGHT: 170.3 LBS | TEMPERATURE: 98.2 F | BODY MASS INDEX: 26.73 KG/M2 | HEIGHT: 67 IN

## 2024-08-01 DIAGNOSIS — G93.89 BRAIN MASS: Primary | ICD-10-CM

## 2024-08-01 DIAGNOSIS — H50.9 STRABISMUS: ICD-10-CM

## 2024-08-01 DIAGNOSIS — H49.22 LEFT ABDUCENS NERVE PALSY: Primary | ICD-10-CM

## 2024-08-01 PROCEDURE — 99204 OFFICE O/P NEW MOD 45 MIN: CPT | Performed by: NEUROLOGICAL SURGERY

## 2024-08-01 NOTE — PROGRESS NOTES
NAME: SHAYLA REESE   DOS: 2024  : 1960  PCP: Geoffrey Kim DO    Chief Complaint:    Chief Complaint   Patient presents with    Rt temporal mass       History of Present Illness:  64 y.o. female   Is a 64-year-old female neurosurgical consultation she was recently seen in the ER for the presence of left sided orbital pain as well as some diplopia.  The pain was on the left side she has a history of migraine she is a retired  and has been relatively quiescent in the past she has a history of cranial trauma in the past reportedly and is sent here for a T2 signal abnormality in the right temporal area she denies any weakness seizure history she is accompanied by her significant other who is an  she denies recent illnesses etc.    PMHX  Allergies:  No Known Allergies  Medications    Current Outpatient Medications:     clobetasol (TEMOVATE) 0.05 % external solution, Apply  topically to the appropriate area as directed 2 (Two) Times a Day As Needed (psoriasis)., Disp: 50 mL, Rfl: 5    estradiol (VIVELLE-DOT) 0.1 MG/24HR patch, Place 1 patch on the skin as directed by provider 2 (Two) Times a Week., Disp: 24 patch, Rfl: 1    lidocaine 3 % cream cream, Apply  topically Every 4 (Four) Hours As Needed (flare of hemorrhoid)., Disp: 85 g, Rfl: 3    Loratadine (Claritin) 10 MG capsule, Take 1 capsule by mouth Daily As Needed., Disp: , Rfl:     losartan (COZAAR) 50 MG tablet, Take 1 tablet by mouth Daily. Follow up for further refills, Disp: 30 tablet, Rfl: 0    SUMAtriptan (IMITREX) 50 MG tablet, Take 1 tablet by mouth Every 2 (Two) Hours As Needed for Migraine. Take one tablet at onset of headache. May repeat dose one time in 2 hours if headache not relieved., Disp: 4 tablet, Rfl: 0  Past Medical History:  Past Medical History:   Diagnosis Date    Endometriosis     Gallstone     History of medical problems Left ankle has issues; wear a brace.    Hypertension     Impingement syndrome of left  ankle     Meniere disease     Migraine     PONV (postoperative nausea and vomiting)     requires scopolamine     Tibialis posterior tendinopathy      Past Surgical History:  Past Surgical History:   Procedure Laterality Date    CARDIOVASCULAR STRESS TEST      CHOLECYSTECTOMY      CHOLECYSTECTOMY WITH INTRAOPERATIVE CHOLANGIOGRAM N/A 2018    Procedure: CHOLECYSTECTOMY LAPAROSCOPIC INTRAOPERATIVE CHOLANGIOGRAM;  Surgeon: Krunal Sanchez MD;  Location: [facility];  Service: General    COLON SURGERY      rectal tear repair    COLONOSCOPY      CYST REMOVAL      right arm    DENTAL PROCEDURE      total of 4 times     DIAGNOSTIC LAPAROSCOPY      multiple     ENDOSCOPY      GALLBLADDER SURGERY      HERNIA REPAIR      umbilical    HYSTERECTOMY      bso     Social Hx:  Social History     Tobacco Use    Smoking status: Never    Smokeless tobacco: Never   Vaping Use    Vaping status: Never Used   Substance Use Topics    Alcohol use: Never    Drug use: Never     Family Hx:  Family History   Problem Relation Age of Onset    Hypertension Mother     Heart disease Mother             Breast cancer Mother     Cancer Mother         Breast cancer;     Arthritis Mother     Hyperlipidemia Mother     Hypertension Father     Heart disease Father             Heart disease Sister          Maker heart attack and stent     Review of Systems:        Review of Systems   Constitutional:  Negative for activity change, appetite change, chills, diaphoresis, fatigue, fever and unexpected weight change.   HENT:  Negative for congestion, dental problem, drooling, ear discharge, ear pain, facial swelling, hearing loss, mouth sores, nosebleeds, postnasal drip, rhinorrhea, sinus pressure, sinus pain, sneezing, sore throat, tinnitus, trouble swallowing and voice change.    Eyes:  Negative for photophobia, pain, discharge, redness, itching and visual disturbance.   Respiratory:  Negative for apnea, cough, choking, chest  tightness, shortness of breath, wheezing and stridor.    Cardiovascular:  Negative for chest pain, palpitations and leg swelling.   Gastrointestinal:  Negative for abdominal distention, abdominal pain, anal bleeding, blood in stool, constipation, diarrhea, nausea, rectal pain and vomiting.   Endocrine: Negative for cold intolerance, heat intolerance, polydipsia, polyphagia and polyuria.   Genitourinary:  Negative for decreased urine volume, difficulty urinating, dyspareunia, dysuria, enuresis, flank pain, frequency, genital sores, hematuria, menstrual problem, pelvic pain, urgency, vaginal bleeding, vaginal discharge and vaginal pain.   Musculoskeletal:  Negative for arthralgias, back pain, gait problem, joint swelling, myalgias, neck pain and neck stiffness.   Skin:  Negative for color change, pallor, rash and wound.   Allergic/Immunologic: Negative for environmental allergies, food allergies and immunocompromised state.   Neurological:  Positive for headaches. Negative for dizziness, tremors, seizures, syncope, facial asymmetry, speech difficulty, weakness, light-headedness and numbness.   Hematological:  Negative for adenopathy. Does not bruise/bleed easily.   Psychiatric/Behavioral:  Negative for agitation, behavioral problems, confusion, decreased concentration, dysphoric mood, hallucinations, self-injury, sleep disturbance and suicidal ideas. The patient is not nervous/anxious and is not hyperactive.    I have reviewed this note template and all pertinent parts of the review of systems social, family history, surgical history and medication list      Physical Examination:  Vitals:    08/01/24 1117   Temp: 98.2 °F (36.8 °C)      General Appearance:   Well developed, well nourished, well groomed, alert, and cooperative.  Neurological examination:  Neurologic Exam  Vital signs were reviewed and documented in the chart  Patient appeared in good neurologic function with normal comprehension fluent speech  Mood and  affect are normal  Sense of smell deferred    Pupils symmetric equally reactive funduscopic exam not visualized   Visual fields intact to confrontation  Extraocular movements intact with exception of the left-sided 6th nerve there is lateral gaze paresis that I would gauge is moderate  Face motor function is symmetric  Facial sensations normal  Hearing intact to finger rub hearing intact to finger rub  Tongue is midline  Palate symmetric  Swallowing normal  Shoulder shrug normal    Muscle bulk and tone normal  5 out of 5 strength no motor drift  Gait normal intact  Negative Romberg  No clonus long tract signs or myelopathy    Reflexes symmetric          Review of Imaging/DATA:  I personally reviewed and interpreted MRI of the brain I reviewed the studies as well as a CTA there is a right-sided T2 signal change that slight in the anterior temporal area there is no evidence of clear-cut contrast-enhancement this could be either an old encephalomalacia cavity, low-grade glioma, history of prior demyelination    I do not see any CTA evidence of aneurysm or vascular abnormality  Diagnoses/Plan:    Ms. Gonsalez is a 64 y.o. female   1.  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    2.  Incidental right-sided T2 signal abnormality differential considerations would be low-grade glioma, demyelination past or current (less likely)  3.  Migraine history    I explained the risk benefits and expected outcome of major elective surgery for their problem, complications from approach, and infection, the risk of neurologic implications after surgery as well as need for repeat surgeries and most importantly failure to achieve quality of life improvement from the surgery to the patient.  I talked about the risk of biopsy for diagnosis given the incidental nature of this I could not see any  "relationship between it and the 6th nerve palsy that prompted care.  I did explain the option for biopsy or resection in this location but it is associated with risk neurologic deficits etc. but could be performed.  Also explained that from low-grade tumor is surgery is not considered \"curative \"in a short observatory period to follow this with imaging is not unreasonable to figure out what is going on with VI nerve palsy    Plan    1.  She is to follow-up with ophthalmology as well as a neurologist regarding the etiology of her left 6th nerve palsy    2.  Follow-up in 90 days regarding her right-sided T2 signal lesion.  I will be anxious to see if this is a post inflammatory lesion depending on what neurology recommends for workup    She is to report back to the emergency room or call us if she experiences further clinical changes that are on explanatory    As far as headaches ago she does have a history of migraines I think this can be safely monitored without observable neurologic issues    They appear comfortable with plan      "

## 2024-08-02 DIAGNOSIS — I10 ESSENTIAL HYPERTENSION: ICD-10-CM

## 2024-08-02 RX ORDER — LOSARTAN POTASSIUM 50 MG/1
50 TABLET ORAL DAILY
Qty: 30 TABLET | Refills: 2 | Status: SHIPPED | OUTPATIENT
Start: 2024-08-02

## 2024-08-16 ENCOUNTER — OFFICE VISIT (OUTPATIENT)
Dept: FAMILY MEDICINE CLINIC | Facility: CLINIC | Age: 64
End: 2024-08-16
Payer: MEDICARE

## 2024-08-16 ENCOUNTER — LAB (OUTPATIENT)
Dept: LAB | Facility: HOSPITAL | Age: 64
End: 2024-08-16
Payer: MEDICARE

## 2024-08-16 VITALS
OXYGEN SATURATION: 98 % | BODY MASS INDEX: 26.68 KG/M2 | HEIGHT: 67 IN | WEIGHT: 170 LBS | TEMPERATURE: 97.4 F | HEART RATE: 82 BPM | SYSTOLIC BLOOD PRESSURE: 100 MMHG | DIASTOLIC BLOOD PRESSURE: 60 MMHG

## 2024-08-16 DIAGNOSIS — H81.13 BENIGN PAROXYSMAL POSITIONAL VERTIGO DUE TO BILATERAL VESTIBULAR DISORDER: ICD-10-CM

## 2024-08-16 DIAGNOSIS — I10 ESSENTIAL HYPERTENSION: ICD-10-CM

## 2024-08-16 DIAGNOSIS — I10 PRIMARY HYPERTENSION: ICD-10-CM

## 2024-08-16 DIAGNOSIS — Z00.00 WELCOME TO MEDICARE PREVENTIVE VISIT: Primary | ICD-10-CM

## 2024-08-16 PROCEDURE — 36415 COLL VENOUS BLD VENIPUNCTURE: CPT

## 2024-08-16 PROCEDURE — 80061 LIPID PANEL: CPT

## 2024-08-16 RX ORDER — LOSARTAN POTASSIUM 50 MG/1
50 TABLET ORAL DAILY
Qty: 30 TABLET | Refills: 2 | Status: SHIPPED | OUTPATIENT
Start: 2024-08-16 | End: 2024-08-16 | Stop reason: SDUPTHER

## 2024-08-16 RX ORDER — LOSARTAN POTASSIUM 50 MG/1
50 TABLET ORAL DAILY
Qty: 90 TABLET | Refills: 3 | Status: SHIPPED | OUTPATIENT
Start: 2024-08-16 | End: 2024-08-19 | Stop reason: SDUPTHER

## 2024-08-16 RX ORDER — MECLIZINE HYDROCHLORIDE 25 MG/1
25 TABLET ORAL 3 TIMES DAILY PRN
Qty: 30 TABLET | Refills: 2 | Status: SHIPPED | OUTPATIENT
Start: 2024-08-16

## 2024-08-16 RX ORDER — PREDNISONE 10 MG/1
10 TABLET ORAL DAILY
COMMUNITY
Start: 2024-08-13 | End: 2025-08-13

## 2024-08-16 RX ORDER — PYRIDOSTIGMINE BROMIDE 60 MG/1
60 TABLET ORAL 3 TIMES DAILY
COMMUNITY
Start: 2024-08-10

## 2024-08-16 RX ORDER — ESTRADIOL 0.1 MG/D
1 FILM, EXTENDED RELEASE TRANSDERMAL 2 TIMES WEEKLY
Qty: 24 PATCH | Refills: 1 | Status: SHIPPED | OUTPATIENT
Start: 2024-08-19 | End: 2024-08-19 | Stop reason: SDUPTHER

## 2024-08-16 NOTE — TELEPHONE ENCOUNTER
Caller: [facility] - [place] - [facility] AT Portal to Registered [facility] - [phone]  - [phone]    Relationship: Pharmacy    Best call back number: [phone] REF# [id number]    Requested Prescriptions:   Requested Prescriptions     Pending Prescriptions Disp Refills    losartan (COZAAR) 50 MG tablet 30 tablet 2     Sig: Take 1 tablet by mouth Daily. Follow up for further refills        Pharmacy where request should be sent: [facility] [place] [facility] - [facility] AT PORTAL TO REGISTERED [facility] - [phone]  - [phone] FX     Last office visit with prescribing clinician: Visit date not found   Last telemedicine visit with prescribing clinician: Visit date not found   Next office visit with prescribing clinician: Visit date not found     Additional details provided by patient: NA    Does the patient have less than a 3 day supply:  [] Yes  [x] No    Would you like a call back once the refill request has been completed: [] Yes [x] No    If the office needs to give you a call back, can they leave a voicemail: [] Yes [x] No    Eduardo Mendes Rep   08/16/24 09:37 EDT

## 2024-08-16 NOTE — PROGRESS NOTES
Subjective   The ABCs of the Annual Wellness Visit  Medicare Wellness Visit      Nicole Gonsalez is a 64 y.o. patient who presents for a Medicare Wellness Visit.    The following portions of the patient's history were reviewed and   updated as appropriate: allergies, current medications, past family history, past medical history, past social history, past surgical history, and problem list.    Compared to one year ago, the patient's physical   health is worse.  Compared to one year ago, the patient's mental   health is the same.    Recent Hospitalizations:  She was not admitted to the hospital during the last year.     Current Medical Providers:  Patient Care Team:  Geoffrey Kim DO as PCP - General (Family Medicine)  Geoffrey Kim DO as Consulting Physician (Family Medicine)  William Mendoza MD as Surgeon (Neurosurgery)    Outpatient Medications Prior to Visit   Medication Sig Dispense Refill    clobetasol (TEMOVATE) 0.05 % external solution Apply  topically to the appropriate area as directed 2 (Two) Times a Day As Needed (psoriasis). 50 mL 5    CREAM BASE EX Apply  topically to the appropriate area as directed Daily.      lidocaine 3 % cream cream Apply  topically Every 4 (Four) Hours As Needed (flare of hemorrhoid). 85 g 3    pyridostigmine (MESTINON) 60 MG tablet Take 1 tablet by mouth 3 (Three) Times a Day.      estradiol (VIVELLE-DOT) 0.1 MG/24HR patch Place 1 patch on the skin as directed by provider 2 (Two) Times a Week. 24 patch 1    losartan (COZAAR) 50 MG tablet Take 1 tablet by mouth Daily. Only give Aurobindo Mfr 30 tablet 2    predniSONE (DELTASONE) 10 MG tablet Take 1 tablet by mouth Daily.      Loratadine (Claritin) 10 MG capsule Take 1 capsule by mouth Daily As Needed.      SUMAtriptan (IMITREX) 50 MG tablet Take 1 tablet by mouth Every 2 (Two) Hours As Needed for Migraine. Take one tablet at onset of headache. May repeat dose one time in 2 hours if headache not relieved. 4 tablet 0     No  "facility-administered medications prior to visit.     No opioid medication identified on active medication list. I have reviewed chart for other potential  high risk medication/s and harmful drug interactions in the elderly.      Aspirin is not on active medication list.  Aspirin use is not indicated based on review of current medical condition/s. Risk of harm outweighs potential benefits.  .    Patient Active Problem List   Diagnosis    Benign paroxysmal positional vertigo    Hypertension    Annual physical exam    Rosacea    Psoriasis     Advance Care Planning Advance Directive is on file.  ACP discussion was held with the patient during this visit. Patient has an advance directive in EMR which is still valid.             Objective   Vitals:    24 1106   BP: 100/60   Pulse: 82   Temp: 97.4 °F (36.3 °C)   SpO2: 98%   Weight: 77.1 kg (170 lb)   Height: 170.2 cm (67.01\")       Estimated body mass index is 26.62 kg/m² as calculated from the following:    Height as of this encounter: 170.2 cm (67.01\").    Weight as of this encounter: 77.1 kg (170 lb).            Does the patient have evidence of cognitive impairment? No  Lab Results   Component Value Date    TRIG 190 (H) 2024    HDL 39 (L) 2024     (H) 2024    VLDL 33 2024                                                                                                Health  Risk Assessment    Smoking Status:  Social History     Tobacco Use   Smoking Status Never   Smokeless Tobacco Never     Alcohol Consumption:  Social History     Substance and Sexual Activity   Alcohol Use Never       Fall Risk Screen  STEADI Fall Risk Assessment was completed, and patient is at LOW risk for falls.Assessment completed on:2024    Depression Screenin/16/2024    11:15 AM   PHQ-2/PHQ-9 Depression Screening   Little Interest or Pleasure in Doing Things 0-->not at all   Feeling Down, Depressed or Hopeless 0-->not at all   PHQ-9: Brief " Depression Severity Measure Score 0     Health Habits and Functional and Cognitive Screenin/29/2024    11:00 AM   Functional & Cognitive Status   Do you have difficulty preparing food and eating? No   Do you have difficulty bathing yourself, getting dressed or grooming yourself? No   Do you have difficulty using the toilet? No   Do you have difficulty moving around from place to place? No   Do you have trouble with steps or getting out of a bed or a chair? No   Current Diet Well Balanced Diet   Dental Exam Up to date   Eye Exam Up to date   Exercise (times per week) 6 times per week   Current Exercises Include Walking   Do you need help using the phone?  No   Are you deaf or do you have serious difficulty hearing?  No   Do you need help to go to places out of walking distance? No   Do you need help shopping? No   Do you need help preparing meals?  No   Do you need help with housework?  No   Do you need help with laundry? No   Do you need help taking your medications? No   Do you need help managing money? No   Do you ever drive or ride in a car without wearing a seat belt? No   Have you felt unusual stress, anger or loneliness in the last month? No   Who do you live with? Spouse   If you need help, do you have trouble finding someone available to you? No   Have you been bothered in the last four weeks by sexual problems? No   Do you have difficulty concentrating, remembering or making decisions? No           Age-appropriate Screening Schedule:  Refer to the list below for future screening recommendations based on patient's age, sex and/or medical conditions. Orders for these recommended tests are listed in the plan section. The patient has been provided with a written plan.    Health Maintenance List  Health Maintenance   Topic Date Due    DXA SCAN  Never done    TDAP/TD VACCINES (1 - Tdap) Never done    Pneumococcal Vaccine 65+ (1 of 1 - PCV) Never done    INFLUENZA VACCINE  2024    COVID-19 Vaccine  (3 - 2023-24 season) 08/16/2025 (Originally 9/1/2023)    ZOSTER VACCINE (1 of 2) 08/16/2025 (Originally 7/9/2009)    BMI FOLLOWUP  08/16/2025    ANNUAL WELLNESS VISIT  08/29/2025    MAMMOGRAM  11/10/2025    HEPATITIS C SCREENING  Completed    COLORECTAL CANCER SCREENING  Discontinued                                                                                                                                                CMS Preventative Services Quick Reference  Risk Factors Identified During Encounter  None Identified    The above risks/problems have been discussed with the patient.  Pertinent information has been shared with the patient in the After Visit Summary.  An After Visit Summary and PPPS were made available to the patient.    Follow Up:   Next Medicare Wellness visit to be scheduled in 1 year.         Additional E&M Note during same encounter follows:  Patient has additional, significant, and separately identifiable condition(s)/problem(s) that require work above and beyond the Medicare Wellness Visit     Chief Complaint  Annual Exam    Jenny Rivera is also being seen today for additional medical problem/s.       The patient presents for evaluation of multiple medical concerns.    She has been diagnosed with ocular myasthenia gravis, a condition affecting 20 out of every 100,000 individuals. She is currently on pyridostigmine, which she takes three times daily at a dose of 60 mg. This medication will be continued indefinitely unless her condition goes into remission. Her vision in the left eye has improved to 85% since starting the medication last Saturday. Initially, she experienced blurry vision and saw three images instead of one, leading her to suspect a new onset of vertigo. However, a visit to the ER ruled out a stroke and identified the issue as being isolated to her left eye. An MRI of the full brain with and without contrast was performed, revealing a brain mass. The  neurosurgeon was not overly concerned and advised a follow-up in a few weeks. She also consulted a neuro-ophthalmologist who recommended a carotid ultrasound and an MRI of the ocular nerve. She is due for a follow-up with the neuro-ophthalmologist this Tuesday. She has been advised to undergo annual or biennial MRIs for ongoing monitoring. She reports that the medication causes diarrhea and frequent bowel movements, over which she has no control.    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 She uses insoles for support.    She is seeking a refill of her Sandoz patches and losartan prescription. She mentions that when the pharmacy attempted to change the brand of her medication, she experienced excessive sweating. She also requests a pelvic exam and blood work, including a thyroid test. She has no history of thyroid issues.    She has a history of Benign Paroxysmal Positional Vertigo (BPPV) and has undergone five Epley maneuvers. She was advised to use ear plugs during showers and hair washes to prevent dislodging the crystals in her ears. She was prescribed meclizine and Valium for severe symptoms. She is curious if she can take meclizine with pyridostigmine.    She underwent a radical hysterectomy in 1998 and does not require a Pap smear. She has not had a urinalysis done. She has a history of colorectal surgery and uses hydrocortisone lidocaine cream for hemorrhoids, which are exacerbated by diarrhea.    She is not overly concerned about the small brain mass identified, as it is believed to be either an old injury or a benign tumor. A follow-up scan is scheduled for 11/02/2024 to monitor this.    SOCIAL HISTORY  She is a retired principal.    FAMILY HISTORY  Her mother had rheumatoid arthritis and ALS. Her mother had breast cancer when she was 70.          Objective   Vital Signs:  /60   Pulse 82   Temp  "97.4 °F (36.3 °C)   Ht 170.2 cm (67.01\")   Wt 77.1 kg (170 lb)   SpO2 98%   BMI 26.62 kg/m²   Physical Exam  Vitals and nursing note reviewed. Exam conducted with a chaperone present.   Constitutional:       General: She is not in acute distress.     Appearance: Normal appearance. She is well-developed.   HENT:      Head: Normocephalic and atraumatic.      Right Ear: Tympanic membrane and ear canal normal. There is no impacted cerumen.      Left Ear: Tympanic membrane and ear canal normal. There is no impacted cerumen.      Nose: Nose normal. No congestion or rhinorrhea.      Mouth/Throat:      Mouth: Mucous membranes are moist.      Pharynx: Oropharynx is clear. No oropharyngeal exudate or posterior oropharyngeal erythema.   Eyes:      General: No scleral icterus.        Right eye: No discharge.         Left eye: No discharge.      Extraocular Movements: Extraocular movements intact.      Conjunctiva/sclera: Conjunctivae normal.      Pupils: Pupils are equal, round, and reactive to light.   Neck:      Thyroid: No thyromegaly.      Vascular: No carotid bruit.   Cardiovascular:      Rate and Rhythm: Normal rate and regular rhythm.      Heart sounds: Normal heart sounds. No murmur heard.  Pulmonary:      Breath sounds: Normal breath sounds. No wheezing, rhonchi or rales.   Chest:   Breasts:     Right: Normal.      Left: Normal.   Abdominal:      General: Bowel sounds are normal. There is no distension.      Palpations: Abdomen is soft. There is no mass.      Tenderness: There is no abdominal tenderness.   Genitourinary:     General: Normal vulva.      Exam position: Lithotomy position.      Vagina: Normal.      Uterus: Absent.    Musculoskeletal:         General: No swelling. Normal range of motion.      Cervical back: Normal range of motion and neck supple.      Right lower leg: No edema.      Left lower leg: No edema.   Lymphadenopathy:      Cervical: No cervical adenopathy.      Upper Body:      Right upper " body: No supraclavicular or axillary adenopathy.      Left upper body: No supraclavicular or axillary adenopathy.   Skin:     General: Skin is warm.      Coloration: Skin is not jaundiced or pale.      Findings: No bruising or rash.   Neurological:      General: No focal deficit present.      Mental Status: She is alert.      Cranial Nerves: No cranial nerve deficit.      Motor: No weakness.      Gait: Gait normal.   Psychiatric:         Mood and Affect: Mood normal.         Behavior: Behavior normal.         Judgment: Judgment normal.                        Assessment and Plan           1. Ocular Myasthenia Gravis.  The patient has been diagnosed with ocular myasthenia gravis, confirmed by a neurologist and neurosurgeon. She reports 85% improvement in vision in the left eye after starting pyridostigmine 60 mg three times a day. Continued use of pyridostigmine is recommended. A referral to a neuro-ophthalmologist will be made for further evaluation and management. She will have another MRI on November 2 to monitor the brain mass.    2. Benign Brain Lesion.  A brain lesion was identified but is not currently causing concern. Continued monitoring with MRI is planned. No immediate intervention is required.    3. Benign Paroxysmal Positional Vertigo (BPPV).  The patient has a history of BPPV and inquires about the use of meclizine for vertigo management. An interaction check confirmed no interactions between meclizine and pyridostigmine. She may use meclizine as needed for vertigo.    4. Hypertension.  The patient is on losartan for hypertension management. She requests no substitutions for her losartan prescription. Continued use of losartan is recommended.    5. Medication Management.  The patient requests a refill of Sandoz patches and losartan with no substitutions. These will be prescribed as requested.    6. Health Maintenance.  A pelvic exam, breast exam, blood work including a cholesterol panel, and urinalysis  will be conducted today. The patient has a history of a radical hysterectomy and does not require a Pap smear. She also reports a family history of breast cancer, necessitating regular breast exams.      Orders Placed This Encounter   Procedures    Urinalysis With Culture If Indicated -     Standing Status:   Future     Number of Occurrences:   1     Standing Expiration Date:   8/16/2025     Order Specific Question:   Release to patient     Answer:   Routine Release [[id number]]    Lipid panel     Standing Status:   Future     Number of Occurrences:   1     Standing Expiration Date:   8/16/2025     Order Specific Question:   Release to patient     Answer:   Routine Release [[id number]]     New Medications Ordered This Visit   Medications    meclizine (ANTIVERT) 25 MG tablet     Sig: Take 1 tablet by mouth 3 (Three) Times a Day As Needed for Dizziness.     Dispense:  30 tablet     Refill:  2          Follow Up   Return in about 1 year (around 8/16/2025) for Annual.  Patient was given instructions and counseling regarding her condition or for health maintenance advice. Please see specific information pulled into the AVS if appropriate.  Patient or patient representative verbalized consent for the use of Ambient Listening during the visit with  Ana Manriquez PA-C for chart documentation. 8/29/2024  18:05 EDT

## 2024-08-17 LAB
CHOLEST SERPL-MCNC: 174 MG/DL (ref 0–200)
HDLC SERPL-MCNC: 39 MG/DL (ref 40–60)
LDLC SERPL CALC-MCNC: 102 MG/DL (ref 0–100)
LDLC/HDLC SERPL: 2.49 {RATIO}
TRIGL SERPL-MCNC: 190 MG/DL (ref 0–150)
VLDLC SERPL-MCNC: 33 MG/DL (ref 5–40)

## 2024-08-26 ENCOUNTER — LAB (OUTPATIENT)
Dept: LAB | Facility: HOSPITAL | Age: 64
End: 2024-08-26
Payer: MEDICARE

## 2024-08-26 DIAGNOSIS — I10 ESSENTIAL HYPERTENSION: ICD-10-CM

## 2024-08-26 PROCEDURE — 81003 URINALYSIS AUTO W/O SCOPE: CPT

## 2024-08-27 LAB
BILIRUB UR QL STRIP: NEGATIVE
CLARITY UR: CLEAR
COLOR UR: YELLOW
GLUCOSE UR STRIP-MCNC: NEGATIVE MG/DL
HGB UR QL STRIP.AUTO: NEGATIVE
HOLD SPECIMEN: NORMAL
KETONES UR QL STRIP: NEGATIVE
LEUKOCYTE ESTERASE UR QL STRIP.AUTO: NEGATIVE
NITRITE UR QL STRIP: NEGATIVE
PH UR STRIP.AUTO: 7 [PH] (ref 5–8)
PROT UR QL STRIP: NEGATIVE
SP GR UR STRIP: 1.01 (ref 1–1.03)
UROBILINOGEN UR QL STRIP: NORMAL

## 2024-08-29 ENCOUNTER — OFFICE VISIT (OUTPATIENT)
Dept: FAMILY MEDICINE CLINIC | Facility: CLINIC | Age: 64
End: 2024-08-29
Payer: MEDICARE

## 2024-08-29 VITALS
OXYGEN SATURATION: 97 % | SYSTOLIC BLOOD PRESSURE: 108 MMHG | HEIGHT: 67 IN | DIASTOLIC BLOOD PRESSURE: 54 MMHG | HEART RATE: 74 BPM | BODY MASS INDEX: 26.68 KG/M2 | WEIGHT: 170 LBS | TEMPERATURE: 98.7 F

## 2024-08-29 DIAGNOSIS — Z00.00 MEDICARE ANNUAL WELLNESS VISIT, SUBSEQUENT: Primary | ICD-10-CM

## 2024-08-29 DIAGNOSIS — R91.1 LUNG NODULE: ICD-10-CM

## 2024-08-29 PROCEDURE — G0439 PPPS, SUBSEQ VISIT: HCPCS | Performed by: FAMILY MEDICINE

## 2024-08-29 PROCEDURE — 1126F AMNT PAIN NOTED NONE PRSNT: CPT | Performed by: FAMILY MEDICINE

## 2024-08-29 PROCEDURE — 1170F FXNL STATUS ASSESSED: CPT | Performed by: FAMILY MEDICINE

## 2024-08-29 PROCEDURE — 1160F RVW MEDS BY RX/DR IN RCRD: CPT | Performed by: FAMILY MEDICINE

## 2024-08-29 PROCEDURE — 3074F SYST BP LT 130 MM HG: CPT | Performed by: FAMILY MEDICINE

## 2024-08-29 PROCEDURE — 3078F DIAST BP <80 MM HG: CPT | Performed by: FAMILY MEDICINE

## 2024-08-29 PROCEDURE — 1159F MED LIST DOCD IN RCRD: CPT | Performed by: FAMILY MEDICINE

## 2024-08-29 RX ORDER — LORATADINE 10 MG/1
TABLET ORAL
COMMUNITY

## 2024-08-29 NOTE — PROGRESS NOTES
Subjective   The ABCs of the Annual Wellness Visit  Medicare Wellness Visit      Nicole Gonsalez is a 64 y.o. patient who presents for a Medicare Wellness Visit.    The following portions of the patient's history were reviewed and   updated as appropriate: allergies, current medications, past family history, past medical history, past social history, past surgical history, and problem list.    Compared to one year ago, the patient's physical   health is worse.  Compared to one year ago, the patient's mental   health is the same.    Recent Hospitalizations:  She was not admitted to the hospital during the last year.     Current Medical Providers:  Patient Care Team:  Geoffrey Kim DO as PCP - General (Family Medicine)  Geoffrey Kim DO as Consulting Physician (Family Medicine)  William Mendoza MD as Surgeon (Neurosurgery)    Outpatient Medications Prior to Visit   Medication Sig Dispense Refill    clobetasol (TEMOVATE) 0.05 % external solution Apply  topically to the appropriate area as directed 2 (Two) Times a Day As Needed (psoriasis). 50 mL 5    CREAM BASE EX Apply  topically to the appropriate area as directed Daily.      estradiol (VIVELLE-DOT) 0.1 MG/24HR patch Place 1 patch on the skin as directed by provider 2 (Two) Times a Week. 24 patch 1    lidocaine 3 % cream cream Apply  topically Every 4 (Four) Hours As Needed (flare of hemorrhoid). 85 g 3    loratadine (CLARITIN) 10 MG tablet       losartan (COZAAR) 50 MG tablet Take 1 tablet by mouth Daily. Only give Aurobindo Mfr 90 tablet 3    meclizine (ANTIVERT) 25 MG tablet Take 1 tablet by mouth 3 (Three) Times a Day As Needed for Dizziness. 30 tablet 2    predniSONE (DELTASONE) 10 MG tablet Take 1 tablet by mouth Daily.      pyridostigmine (MESTINON) 60 MG tablet Take 1 tablet by mouth 3 (Three) Times a Day.       No facility-administered medications prior to visit.     No opioid medication identified on active medication list. I have reviewed chart for  "other potential  high risk medication/s and harmful drug interactions in the elderly.      Aspirin is not on active medication list.  Aspirin use is not indicated based on review of current medical condition/s. Risk of harm outweighs potential benefits.  .    Patient Active Problem List   Diagnosis    Benign paroxysmal positional vertigo    Hypertension    Annual physical exam    Rosacea    Psoriasis     Advance Care Planning Advance Directive is on file.  ACP discussion was held with the patient during this visit. Patient has an advance directive in EMR which is still valid.             Objective   Vitals:    24 1126   BP: 108/54   Pulse: 74   Temp: 98.7 °F (37.1 °C)   TempSrc: Temporal   SpO2: 97%   Weight: 77.1 kg (170 lb)   Height: 170.2 cm (67\")   PainSc: 0-No pain       Estimated body mass index is 26.63 kg/m² as calculated from the following:    Height as of this encounter: 170.2 cm (67\").    Weight as of this encounter: 77.1 kg (170 lb).            Does the patient have evidence of cognitive impairment? No  Lab Results   Component Value Date    TRIG 190 (H) 2024    HDL 39 (L) 2024     (H) 2024    VLDL 33 2024                                                                                              Health  Risk Assessment    Smoking Status:  Social History     Tobacco Use   Smoking Status Never   Smokeless Tobacco Never     Alcohol Consumption:  Social History     Substance and Sexual Activity   Alcohol Use Never       Fall Risk Screen  STEADI Fall Risk Assessment was completed, and patient is at LOW risk for falls.Assessment completed on:2024    Depression Screenin/16/2024    11:15 AM   PHQ-2/PHQ-9 Depression Screening   Little Interest or Pleasure in Doing Things 0-->not at all   Feeling Down, Depressed or Hopeless 0-->not at all   PHQ-9: Brief Depression Severity Measure Score 0     Health Habits and Functional and Cognitive Screenin/29/2024    " 11:00 AM   Functional & Cognitive Status   Do you have difficulty preparing food and eating? No   Do you have difficulty bathing yourself, getting dressed or grooming yourself? No   Do you have difficulty using the toilet? No   Do you have difficulty moving around from place to place? No   Do you have trouble with steps or getting out of a bed or a chair? No   Current Diet Well Balanced Diet   Dental Exam Up to date   Eye Exam Up to date   Exercise (times per week) 6 times per week   Current Exercises Include Walking   Do you need help using the phone?  No   Are you deaf or do you have serious difficulty hearing?  No   Do you need help to go to places out of walking distance? No   Do you need help shopping? No   Do you need help preparing meals?  No   Do you need help with housework?  No   Do you need help with laundry? No   Do you need help taking your medications? No   Do you need help managing money? No   Do you ever drive or ride in a car without wearing a seat belt? No   Have you felt unusual stress, anger or loneliness in the last month? No   Who do you live with? Spouse   If you need help, do you have trouble finding someone available to you? No   Have you been bothered in the last four weeks by sexual problems? No   Do you have difficulty concentrating, remembering or making decisions? No           Age-appropriate Screening Schedule:  Refer to the list below for future screening recommendations based on patient's age, sex and/or medical conditions. Orders for these recommended tests are listed in the plan section. The patient has been provided with a written plan.    Health Maintenance List  Health Maintenance   Topic Date Due    DXA SCAN  Never done    TDAP/TD VACCINES (1 - Tdap) Never done    Pneumococcal Vaccine 65+ (1 of 1 - PCV) Never done    INFLUENZA VACCINE  08/01/2024    COVID-19 Vaccine (3 - 2023-24 season) 08/16/2025 (Originally 9/1/2023)    ZOSTER VACCINE (1 of 2) 08/16/2025 (Originally 7/9/2009)     BMI FOLLOWUP  08/16/2025    ANNUAL WELLNESS VISIT  08/29/2025    MAMMOGRAM  11/10/2025    HEPATITIS C SCREENING  Completed    COLORECTAL CANCER SCREENING  Discontinued                                                                                                                                                CMS Preventative Services Quick Reference  Risk Factors Identified During Encounter  Immunizations Discussed/Encouraged: Prevnar 20 (Pneumococcal 20-valent conjugate)  Inactivity/Sedentary: Patient was advised to exercise at least 150 minutes a week per CDC recommendations.    The above risks/problems have been discussed with the patient.  Pertinent information has been shared with the patient in the After Visit Summary.  An After Visit Summary and PPPS were made available to the patient.    Follow Up:  Next Medicare Wellness visit to be scheduled in 1 year.     Assessment & Plan  Medicare annual wellness visit, subsequent    Lung nodule  Referred to lung nodule clinic.    Orders Placed This Encounter   Procedures    Ambulatory Referral to Lung Nodule Clinic - [place]             Follow Up:  Return in about 1 year (around 8/29/2025) for Medicare Wellness.

## 2024-09-05 PROBLEM — R91.1 PULMONARY NODULE: Status: ACTIVE | Noted: 2024-09-05

## 2024-09-05 NOTE — PROGRESS NOTES
"[facility]    CHIEF COMPLAINT    Pulmonary nodule     HISTORY OF PRESENT ILLNESS    Nicole Gonsalez is a 64 y.o.female with PMH of HTN, ocular myasthenia gravis, psoriasis, rosacea, vertigo.     Was recently found to have ocular MG and underwent workup including a CT of the chest. She is on pyridostigmine and prednisone.     She was referred by her PCP for an abnormal CT of the chest performed on 8/27/2024 that did show a 7 mm RLL pulmonary nodule.  Also noted to had atelectasis/scarring in the dependent portion of the bilateral lower lobes.    History of recurrent respiratory illness as a child. Does not carry a history of childhood asthma. Was a full term infant.     She is a lifelong non-smoker.    Has had COVID twice that did not require hospitalization.     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    Does have a dog that is hypoallergenic.     She is a \"reading machine\". Walks her dog outside multiple times a day.     Lives in a 2 story home built in the 1990s. Does have a basement. Has not been tested for radon.     Denies recent ED visits, hospitalizations, or exacerbations.     Denies fever, chills, night sweats, or hemoptysis. No recent sick contacts. No chest pain or palpitations. Denies lower extremity swelling or calf tenderness.     Patient Active Problem List   Diagnosis    Benign paroxysmal positional vertigo    Hypertension    Annual physical exam    Rosacea    Psoriasis    7 mm RLL pulmonary nodule    Non-smoker       No Known Allergies    Current Outpatient Medications:     clobetasol (TEMOVATE) 0.05 % external solution, Apply  topically to the appropriate area as directed 2 (Two) Times a Day As Needed (psoriasis)., Disp: 50 mL, Rfl: 5    CREAM BASE EX, Apply  topically to the appropriate area as directed Daily., Disp: , Rfl:     estradiol (VIVELLE-DOT) 0.1 MG/24HR patch, Place 1 patch on the skin as directed by " "provider 2 (Two) Times a Week., Disp: 24 patch, Rfl: 1    lidocaine 3 % cream cream, Apply  topically Every 4 (Four) Hours As Needed (flare of hemorrhoid)., Disp: 85 g, Rfl: 3    loratadine (CLARITIN) 10 MG tablet, , Disp: , Rfl:     losartan (COZAAR) 50 MG tablet, Take 1 tablet by mouth Daily. Only give Aurobindo Mfr, Disp: 90 tablet, Rfl: 3    meclizine (ANTIVERT) 25 MG tablet, Take 1 tablet by mouth 3 (Three) Times a Day As Needed for Dizziness., Disp: 30 tablet, Rfl: 2    predniSONE (DELTASONE) 10 MG tablet, Take 1 tablet by mouth Daily., Disp: , Rfl:     pyridostigmine (MESTINON) 60 MG tablet, Take 1 tablet by mouth 3 (Three) Times a Day., Disp: , Rfl:   MEDICATION LIST AND ALLERGIES REVIEWED.    Social History     Tobacco Use    Smoking status: Never    Smokeless tobacco: Never   Vaping Use    Vaping status: Never Used   Substance Use Topics    Alcohol use: Never    Drug use: Never       FAMILY AND SOCIAL HISTORY REVIEWED.    Review of Systems   Constitutional:  Negative for chills, fatigue and fever.   Respiratory:  Negative for cough, chest tightness, shortness of breath and wheezing.    Cardiovascular:  Negative for chest pain, palpitations and leg swelling.   Skin:  Negative for color change.   Psychiatric/Behavioral:  Negative for sleep disturbance.    All other systems reviewed and are negative.  .    /68   Pulse 81   Temp 98 °F (36.7 °C) (Infrared)   Ht 170.2 cm (67.01\")   Wt 78 kg (172 lb)   SpO2 97% Comment: room air at rest  BMI 26.93 kg/m²     Immunization History   Administered Date(s) Administered    COVID-19 (PFIZER) Purple Cap Monovalent 03/04/2021, 03/30/2021    Flu Vaccine Quad PF 6-35MO 11/18/2019    Flu Vaccine Quad PF >36MO 10/22/2018    Flu Vaccine Split Quad 11/18/2019, 10/08/2020    Flucelvax Quad Vial =>4yrs 11/18/2019    Fluzone  >6mos 12/18/2016    Fluzone (or Fluarix & Flulaval for VFC) >6mos 10/22/2018    Influenza MDCK Quadrivalent with Preserative 11/18/2019, " 10/08/2020       Physical Exam  Vitals reviewed.   Constitutional:       General: She is not in acute distress.     Appearance: Normal appearance.   Cardiovascular:      Rate and Rhythm: Normal rate and regular rhythm.      Pulses: Normal pulses.      Heart sounds: Normal heart sounds.   Pulmonary:      Effort: Pulmonary effort is normal. No respiratory distress.      Breath sounds: No wheezing, rhonchi or rales.   Skin:     General: Skin is warm and dry.   Neurological:      Mental Status: She is alert.         RESULTS    PFTs:  9/6/2024: No airway obstruction.  No restriction.  Normal DLCO.    Imaging:   CT chest with contrast 8/27/2024 at  impression:  1. No mediastinal mass or lesion identified.   2. Nonspecific 7 mm nodule in the right lower lobe.     PROBLEM LIST    Problem List Items Addressed This Visit       7 mm RLL pulmonary nodule - Primary    Relevant Orders    Spirometry with Diffusion Capacity & Lung Volumes (Completed)    CT Chest Without Contrast    Non-smoker       DISCUSSION    Ms. Gonsalez was seen today to establish care with pulmonary regarding a 7 mm right lower lobe pulmonary nodule noticed on 8/27/2024 CT imaging performed at .    This does appear to be noted on the 2018 abdominal CT, but was not mentioned on the radiology report.    She is a lifelong non-smoker and has not had any recurrent respiratory issues.  Did have COVID 19 twice but did not require hospitalization.    7 mm RLL pulmonary nodule  Non-smoker  Noted on OSH CT of the chest from August 2024  Does appear to be noted on the abdominal CT from 2018, however was not mentioned.  Low risk individual qualifies as a lung RADS 3 as this was not mentioned on the 2018 imaging.  Serial CT in 6 months for ongoing evaluation    I personally spent a total of 31 minutes on patient visit today including chart review, face to face with the patient obtaining the history and physical exam, review of pertinent images and tests, counseling and  discussion and/or coordination of care as described above, and documentation.  Total time excludes time spent on other separate services such as performing procedures or test interpretation, if applicable.    Electronically signed by LISBETH Gan, 09/06/24, 10:17 AM EDT.    Please note that portions of this note were completed with a voice recognition program.      CC: Geoffrey Kim, DO

## 2024-09-06 ENCOUNTER — OFFICE VISIT (OUTPATIENT)
Dept: PULMONOLOGY | Facility: CLINIC | Age: 64
End: 2024-09-06
Payer: MEDICARE

## 2024-09-06 VITALS
BODY MASS INDEX: 27 KG/M2 | DIASTOLIC BLOOD PRESSURE: 68 MMHG | TEMPERATURE: 98 F | SYSTOLIC BLOOD PRESSURE: 120 MMHG | OXYGEN SATURATION: 97 % | HEART RATE: 81 BPM | WEIGHT: 172 LBS | HEIGHT: 67 IN

## 2024-09-06 DIAGNOSIS — R91.1 PULMONARY NODULE: Primary | ICD-10-CM

## 2024-09-06 DIAGNOSIS — Z78.9 NON-SMOKER: ICD-10-CM

## 2024-09-09 RX ORDER — ESTRADIOL 0.1 MG/D
1 FILM, EXTENDED RELEASE TRANSDERMAL 2 TIMES WEEKLY
Qty: 24 PATCH | Refills: 1 | Status: CANCELLED | OUTPATIENT
Start: 2024-09-09

## 2024-11-01 ENCOUNTER — HOSPITAL ENCOUNTER (OUTPATIENT)
Dept: MRI IMAGING | Facility: HOSPITAL | Age: 64
Discharge: HOME | End: 2024-11-01
Payer: MEDICARE

## 2024-11-01 DIAGNOSIS — H50.9 STRABISMUS: ICD-10-CM

## 2024-11-01 DIAGNOSIS — G93.89 BRAIN MASS: ICD-10-CM

## 2024-11-01 PROCEDURE — 0 GADOBENATE DIMEGLUMINE 529 MG/ML SOLUTION: Performed by: NEUROLOGICAL SURGERY

## 2024-11-01 PROCEDURE — A9577 INJ MULTIHANCE: HCPCS | Performed by: NEUROLOGICAL SURGERY

## 2024-11-01 PROCEDURE — 70553 MRI BRAIN STEM W/O & W/DYE: CPT

## 2024-11-01 RX ADMIN — GADOBENATE DIMEGLUMINE 16 ML: 529 INJECTION, SOLUTION INTRAVENOUS at 14:47

## 2024-11-04 ENCOUNTER — OFFICE VISIT (OUTPATIENT)
Dept: NEUROSURGERY | Facility: CLINIC | Age: 64
End: 2024-11-04
Payer: MEDICARE

## 2024-11-04 VITALS — BODY MASS INDEX: 27.01 KG/M2 | TEMPERATURE: 98.6 F | HEIGHT: 67 IN | WEIGHT: 172.1 LBS

## 2024-11-04 DIAGNOSIS — G93.89 BRAIN MASS: Primary | ICD-10-CM

## 2024-11-04 PROCEDURE — 1159F MED LIST DOCD IN RCRD: CPT | Performed by: NEUROLOGICAL SURGERY

## 2024-11-04 PROCEDURE — 99214 OFFICE O/P EST MOD 30 MIN: CPT | Performed by: NEUROLOGICAL SURGERY

## 2024-11-04 PROCEDURE — 1160F RVW MEDS BY RX/DR IN RCRD: CPT | Performed by: NEUROLOGICAL SURGERY

## 2024-11-04 RX ORDER — ERGOCALCIFEROL (VITAMIN D2) 10 MCG
400 TABLET ORAL DAILY
COMMUNITY

## 2024-11-04 NOTE — PROGRESS NOTES
NAME: SHAYLA REESE   DOS: 2024  : 1960  PCP: Geoffrey Kim DO    Chief Complaint:    Chief Complaint   Patient presents with    Follow-up     FUP with MRI. Brain mass.      Blurred Vision     Pt was diagnosed with Myasthenia Gravis.       History of Present Illness:  64 y.o. 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 as well as some incidental issues in the right temporal area we talked about observational care given the acute onset of the 6th nerve palsy and it was most certainly that this was unrelated    Differential considerations would been an inflammatory process, encephalomalacia and/or low-grade glioma type of lesion she is here for follow-up her eye is better she is currently on steroid she has been followed up with neurology    PMHX  Allergies:  No Known Allergies  Medications    Current Outpatient Medications:     clobetasol (TEMOVATE) 0.05 % external solution, Apply  topically to the appropriate area as directed 2 (Two) Times a Day As Needed (psoriasis)., Disp: 50 mL, Rfl: 5    CREAM BASE EX, Apply  topically to the appropriate area as directed Daily., Disp: , Rfl:     estradiol (VIVELLE-DOT) 0.1 MG/24HR patch, Place 1 patch on the skin as directed by provider 2 (Two) Times a Week., Disp: 24 patch, Rfl: 1    lidocaine 3 % cream cream, Apply  topically Every 4 (Four) Hours As Needed (flare of hemorrhoid)., Disp: 85 g, Rfl: 3    losartan (COZAAR) 50 MG tablet, Take 1 tablet by mouth Daily. Only give Aurobindo Mfr, Disp: 90 tablet, Rfl: 3    meclizine (ANTIVERT) 25 MG tablet, Take 1 tablet by mouth 3 (Three) Times a Day As Needed for Dizziness., Disp: 30 tablet, Rfl: 2    predniSONE (DELTASONE) 10 MG tablet, Take 1 tablet by mouth Daily., Disp: , Rfl:     loratadine (CLARITIN) 10 MG tablet, , Disp: , Rfl:     pyridostigmine (MESTINON) 60 MG tablet, Take 1 tablet by mouth 3 (Three) Times a Day., Disp: , Rfl:      Vitamin D, Cholecalciferol, (CHOLECALCIFEROL) 10 MCG (400 UNIT) tablet, Take 1 tablet by mouth Daily., Disp: , Rfl:   Past Medical History:  Past Medical History:   Diagnosis Date    Endometriosis     Gallstone     History of medical problems Left ankle has issues; wear a brace.    Hypertension     Impingement syndrome of left ankle     Meniere disease     Migraine     Myasthenia gravis     PONV (postoperative nausea and vomiting)     requires scopolamine     Tibialis posterior tendinopathy      Past Surgical History:  Past Surgical History:   Procedure Laterality Date    CARDIOVASCULAR STRESS TEST      CHOLECYSTECTOMY      CHOLECYSTECTOMY WITH INTRAOPERATIVE CHOLANGIOGRAM N/A 2018    Procedure: CHOLECYSTECTOMY LAPAROSCOPIC INTRAOPERATIVE CHOLANGIOGRAM;  Surgeon: Krunal Sanchez MD;  Location: [facility];  Service: General    COLON SURGERY      rectal tear repair    COLONOSCOPY      CYST REMOVAL      right arm    DENTAL PROCEDURE      total of 4 times     DIAGNOSTIC LAPAROSCOPY      multiple     ENDOSCOPY      GALLBLADDER SURGERY      HERNIA REPAIR      umbilical    HYSTERECTOMY      bso     Social Hx:  Social History     Tobacco Use    Smoking status: Never    Smokeless tobacco: Never   Vaping Use    Vaping status: Never Used   Substance Use Topics    Alcohol use: Never    Drug use: Never     Family Hx:  Family History   Problem Relation Age of Onset    Hypertension Mother     Heart disease Mother             Breast cancer Mother     Cancer Mother         Breast cancer;     Arthritis Mother     Hyperlipidemia Mother     Hypertension Father     Heart disease Father             Heart disease Sister          Maker heart attack and stent     Review of Systems:        Review of Systems   Constitutional:  Negative for activity change, appetite change, chills, diaphoresis, fatigue, fever and unexpected weight change.   HENT:  Negative for congestion, dental problem, drooling, ear  discharge, ear pain, facial swelling, hearing loss, mouth sores, nosebleeds, postnasal drip, rhinorrhea, sinus pressure, sinus pain, sneezing, sore throat, tinnitus, trouble swallowing and voice change.    Eyes:  Positive for visual disturbance. Negative for photophobia, pain, discharge, redness and itching.   Respiratory:  Negative for apnea, cough, choking, chest tightness, shortness of breath, wheezing and stridor.    Cardiovascular:  Negative for chest pain, palpitations and leg swelling.   Gastrointestinal:  Negative for abdominal distention, abdominal pain, anal bleeding, blood in stool, constipation, diarrhea, nausea, rectal pain and vomiting.   Endocrine: Negative for cold intolerance, heat intolerance, polydipsia, polyphagia and polyuria.   Genitourinary:  Positive for menstrual problem. Negative for decreased urine volume, difficulty urinating, dyspareunia, dysuria, enuresis, flank pain, frequency, genital sores, hematuria, pelvic pain, urgency, vaginal bleeding, vaginal discharge and vaginal pain.   Musculoskeletal:  Negative for arthralgias, back pain, gait problem, joint swelling, myalgias, neck pain and neck stiffness.   Skin:  Negative for color change, pallor, rash and wound.   Allergic/Immunologic: Negative for environmental allergies, food allergies and immunocompromised state.   Neurological:  Negative for dizziness, tremors, seizures, syncope, facial asymmetry, speech difficulty, weakness, light-headedness, numbness and headaches.   Hematological:  Negative for adenopathy. Does not bruise/bleed easily.   Psychiatric/Behavioral:  Negative for agitation, behavioral problems, confusion, decreased concentration, dysphoric mood, hallucinations, self-injury, sleep disturbance and suicidal ideas. The patient is not nervous/anxious and is not hyperactive.    All other systems reviewed and are negative.       I have reviewed this note template and all pertinent parts of the review of systems social,  family history, surgical history and medication list    Physical Examination:  Vitals:    11/04/24 1113   Temp: 98.6 °F (37 °C)      General Appearance:   Well developed, well nourished, well groomed, alert, and cooperative.  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 compared to prior visit  Face motor function is symmetric  Facial sensations normal  Hearing intact to finger rub hearing intact to finger rub  Tongue is midline  Palate symmetric  Swallowing normal  Shoulder shrug normal    Muscle bulk and tone normal  5 out of 5 strength no motor drift  Gait normal intact  Negative Romberg  No clonus long tract signs or myelopathy        Review of Imaging/DATA:  Personally reviewed and interpreted her MRI both old and new the T2 signal changes unchanged there is no evidence of contrast-enhancement or any malicious findings are differential consideration would be low-grade glioma encephalomalacia area or postinflammatory lesion which is less likely now given the lack of resolution    Right periventricular tangle of vessels lateral to the trigone  m is likely a small telangiectasia or micro AVM   Diagnoses/Plan:    Ms. Gonsalez is a 64 y.o. female   1.  Right anterior temporal lesion    2.  Left sided ocular myasthenia    I explained the risk benefits and expected outcome of major elective surgery for their problem, complications from approach, and infection, the risk of neurologic implications after surgery as well as need for repeat surgeries and most importantly failure to achieve quality of life improvement from the  "surgery to the patient.    With a complex visit today I think that she stop because things are stable that this was going to be \"enough\" but as discussed in my prior visit as long with an additional ritual I did explicitly explained that this could be indicative of a low-grade tumor that is asymptomatic    I talked her today about the role of biopsy and/or resection given its location the risk benefits expected outcome as well as the data that would tell us that biopsy may or may not offer improvement in long-term symptom freedom    I explained the ins and outs of this after extensive discussion and shared decision making I think a reasonable option would be    1.  Follow-up 90 days with a repeat MRI to discuss open biopsy this will allow us 1 more cycle to see if it is resolved given the unusual presentation of her ocular myasthenia    2.  I think she needs to approach her neurologist that she saw as well about the option of lumbar puncture and whether or not this could be an inflammatory CNS demyelination plaque I explained that it would be lower probability but it certainly could be rule out with that test especially in the context of a open surgical plan    3.  I talked about the low risk but not 0 risk of complications of observational management    4.  Most importantly I talked her about early follow-up with her  should he have questions regarding the treatment plan    Tentatively as long as she is stable I think a follow-up in 90 days with a repeat MRI would be reasonable    "

## 2025-02-19 RX ORDER — ESTRADIOL 0.1 MG/D
1 FILM, EXTENDED RELEASE TRANSDERMAL 2 TIMES WEEKLY
Qty: 24 PATCH | Refills: 1 | Status: SHIPPED | OUTPATIENT
Start: 2025-02-20

## 2025-02-19 NOTE — TELEPHONE ENCOUNTER
Rx Refill Note  Requested Prescriptions     Pending Prescriptions Disp Refills    estradiol (VIVELLE-DOT) 0.1 MG/24HR patch 24 patch 1     Sig: Place 1 patch on the skin as directed by provider 2 (Two) Times a Week.      Last office visit with prescribing clinician: 8/16/2024   Last telemedicine visit with prescribing clinician: Visit date not found   Next office visit with prescribing clinician: 8/22/2025                         Would you like a call back once the refill request has been completed: [] Yes [] No    If the office needs to give you a call back, can they leave a voicemail: [] Yes [] No    Patt Holly MA  02/19/25, 10:36 EST

## 2025-02-20 RX ORDER — ESTRADIOL 0.1 MG/D
1 FILM, EXTENDED RELEASE TRANSDERMAL 2 TIMES WEEKLY
Qty: 24 PATCH | Refills: 1 | Status: CANCELLED | OUTPATIENT
Start: 2025-02-20

## 2025-02-27 ENCOUNTER — HOSPITAL ENCOUNTER (OUTPATIENT)
Dept: CT IMAGING | Facility: HOSPITAL | Age: 65
Discharge: HOME | End: 2025-02-27
Admitting: NURSE PRACTITIONER
Payer: MEDICARE

## 2025-02-27 DIAGNOSIS — R91.1 PULMONARY NODULE: ICD-10-CM

## 2025-02-27 PROCEDURE — 71250 CT THORAX DX C-: CPT

## 2025-02-28 ENCOUNTER — OFFICE VISIT (OUTPATIENT)
Dept: PULMONOLOGY | Facility: CLINIC | Age: 65
End: 2025-02-28
Payer: MEDICARE

## 2025-02-28 VITALS
HEIGHT: 67 IN | DIASTOLIC BLOOD PRESSURE: 58 MMHG | OXYGEN SATURATION: 93 % | WEIGHT: 166 LBS | BODY MASS INDEX: 26.06 KG/M2 | HEART RATE: 76 BPM | SYSTOLIC BLOOD PRESSURE: 102 MMHG

## 2025-02-28 DIAGNOSIS — R91.1 PULMONARY NODULE: Primary | ICD-10-CM

## 2025-02-28 DIAGNOSIS — J06.9 VIRAL UPPER RESPIRATORY TRACT INFECTION: ICD-10-CM

## 2025-02-28 RX ORDER — PREDNISONE 10 MG/1
TABLET ORAL
Qty: 31 TABLET | Refills: 0 | Status: SHIPPED | OUTPATIENT
Start: 2025-02-28

## 2025-02-28 RX ORDER — BENZONATATE 100 MG/1
200 CAPSULE ORAL 3 TIMES DAILY PRN
Qty: 42 CAPSULE | Refills: 0 | Status: SHIPPED | OUTPATIENT
Start: 2025-02-28

## 2025-02-28 NOTE — PROGRESS NOTES
[facility]    CHIEF COMPLAINT    Pulmonary nodules     HISTORY OF PRESENT ILLNESS    HPI:   Nicole Gonsalez is a 64 y.o.female followed by pulmonary regarding a right lower lobe pulmonary nodule.     Patient establish care with our clinic in September 2024 evaluated by myself.  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    She was also noted to have some atelectasis/scarring in the dependent portion of the bilateral lower lobes. Does have a history of abdulaziz COVID-19 twice but did not require hospitalization.  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    PFTs were unrevealing.    In regards to her myasthenia gravis she is on pyridostigmine and prednisone.    Interval history:   She is here today to discuss her most recent CT.     Did contract COVID over the holidays and did have a presumed viral URI earlier this week.  Continues to have a significant cough with occasional sputum production.  Largely denies any fevers, chills, body aches, hemoptysis, or additional constitutional symptoms.  Does have some left-sided chest discomfort when she coughs.    Denies recent ED visits, hospitalizations, or exacerbations.     No recent sick contacts. No chest pain or palpitations. Denies lower extremity swelling or calf tenderness.     Patient Active Problem List   Diagnosis    Benign paroxysmal positional vertigo    Hypertension    Annual physical exam    Rosacea    Psoriasis    7 mm RLL pulmonary nodule    Non-smoker    Viral upper respiratory tract infection       No Known Allergies    Current Outpatient Medications:     clobetasol (TEMOVATE) 0.05 %  external solution, Apply  topically to the appropriate area as directed 2 (Two) Times a Day As Needed (psoriasis)., Disp: 50 mL, Rfl: 5    CREAM BASE EX, Apply  topically to the appropriate area as directed Daily., Disp: , Rfl:     estradiol (VIVELLE-DOT) 0.1 MG/24HR patch, Place 1 patch on the skin as directed by provider 2 (Two) Times a Week. Would recommend discussing stopping this medication at next visit, Disp: 24 patch, Rfl: 1    lidocaine 3 % cream cream, Apply  topically Every 4 (Four) Hours As Needed (flare of hemorrhoid)., Disp: 85 g, Rfl: 3    losartan (COZAAR) 50 MG tablet, Take 1 tablet by mouth Daily. Only give Aurobindo Mfr, Disp: 90 tablet, Rfl: 3    predniSONE (DELTASONE) 10 MG tablet, Take 0.5 tablets by mouth Daily., Disp: , Rfl:     Vitamin D, Cholecalciferol, (CHOLECALCIFEROL) 10 MCG (400 UNIT) tablet, Take 1 tablet by mouth Daily., Disp: , Rfl:     benzonatate (TESSALON) 100 MG capsule, Take 2 capsules by mouth 3 (Three) Times a Day As Needed for Cough., Disp: 42 capsule, Rfl: 0    loratadine (CLARITIN) 10 MG tablet, , Disp: , Rfl:     meclizine (ANTIVERT) 25 MG tablet, Take 1 tablet by mouth 3 (Three) Times a Day As Needed for Dizziness. (Patient not taking: Reported on 2/28/2025), Disp: 30 tablet, Rfl: 2    predniSONE (DELTASONE) 10 MG tablet, Take 4 tabs daily x 3 days, then take 3 tabs daily x 3 days, then take 2 tabs daily x 3 days, then take 1 tab daily x 3 days, Disp: 31 tablet, Rfl: 0    pyridostigmine (MESTINON) 60 MG tablet, Take 1 tablet by mouth 3 (Three) Times a Day., Disp: , Rfl:   MEDICATION LIST AND ALLERGIES REVIEWED.    Social History     Tobacco Use    Smoking status: Never    Smokeless tobacco: Never   Vaping Use    Vaping status: Never Used   Substance Use Topics    Alcohol use: Never    Drug use: Never       FAMILY AND SOCIAL HISTORY REVIEWED.    Review of Systems   Constitutional:  Negative for chills, fatigue and fever.   Respiratory:  Positive for cough and wheezing.  "Negative for chest tightness and shortness of breath.    Cardiovascular:  Negative for chest pain, palpitations and leg swelling.   Skin:  Negative for color change.   Psychiatric/Behavioral:  Negative for sleep disturbance.    All other systems reviewed and are negative.      /58   Pulse 76   Ht 170.2 cm (67\")   Wt 75.3 kg (166 lb)   SpO2 93%   BMI 26.00 kg/m²     Immunization History   Administered Date(s) Administered    COVID-19 (PFIZER) Purple Cap Monovalent 03/04/2021, 03/30/2021    Flu Vaccine Quad PF 6-35MO 11/18/2019    Flu Vaccine Quad PF >36MO 10/22/2018    Flu Vaccine Split Quad 11/18/2019, 10/08/2020    Flucelvax Quad Vial =>4yrs 11/18/2019    Fluzone  >6mos 12/18/2016    Fluzone (or Fluarix & Flulaval for VFC) >6mos 10/22/2018    Influenza MDCK Quadrivalent with Preserative 11/18/2019, 10/08/2020       Physical Exam  Vitals reviewed.   Constitutional:       General: She is not in acute distress.     Appearance: Normal appearance.   Cardiovascular:      Rate and Rhythm: Normal rate and regular rhythm.      Pulses: Normal pulses.      Heart sounds: Normal heart sounds.   Pulmonary:      Effort: Pulmonary effort is normal. No respiratory distress.      Breath sounds: Wheezing present. No rhonchi or rales.   Skin:     General: Skin is warm and dry.   Neurological:      General: No focal deficit present.      Mental Status: She is alert and oriented to person, place, and time.         RESULTS    PFTs:  9/6/2024: No airway obstruction. No restriction. Normal DLCO.     Imaging:   CT chest without contrast  2/27/2025  Awaiting final MD interpretation.  To the eye the 7 mm and 4 mm right lower lobe pulmonary nodules remain unchanged.  Does appear to have some linear atelectasis in the left lower lobe.  I will contact the patient if the results differ.    CT chest with contrast 8/27/2024 at  impression:  1. No mediastinal mass or lesion identified.   2. Nonspecific 7 mm nodule in the right lower " lobe.     Cardiac:   None on file      PROBLEM LIST    Problem List Items Addressed This Visit       7 mm RLL pulmonary nodule - Primary    Relevant Medications    benzonatate (TESSALON) 100 MG capsule    Other Relevant Orders    CT Chest Without Contrast    Viral upper respiratory tract infection    Relevant Medications    predniSONE (DELTASONE) 10 MG tablet    benzonatate (TESSALON) 100 MG capsule       DISCUSSION    Ms. Gonsalez was seen today for follow-up regarding his 7 mm right lower lobe pulmonary nodule incidentally noted on August 2024 CT imaging performed at ..    Was also noted to have a smaller 4 mm nodule in the right lower lobe that was not noted on the radiology report.     The larger 7 mm right lower lobe nodule does appear to be noted on the 2018 abdominal CT, but was not mentioned on the radiology report.     She is a lifelong non-smoker and has not had any recurrent respiratory issues.  Did have COVID 19 twice but did not require hospitalization.    Since I saw her last she has contracted COVID again with mild symptoms and contracted a presumed viral upper respiratory tract infection earlier in the week with ongoing complaints of cough, left-sided chest discomfort, and occasional sputum production.     7 mm RLL pulmonary nodule  4 mm RLL pulmonary nodule  Non-smoker  7 mm RLL nodule noted on OSH CT of the chest from August 2024  Does appear to be noted on the abdominal CT from 2018, however was not mentioned.  Most recent CT completed yesterday has not been formally interpreted by radiologist, but does appear present and unchanged.  Also has a 4 mm RLL nodule that abuts the chest wall not mentioned on the August 2024 report that remains unchanged.   Does appear to have some left lower lobe linear atelectasis versus scarring.  Serial CT in 6 months for ongoing evaluation    URI  Presumed viral in etiology  Does have wheezing and bronchial breath sounds on exam today.  Will go ahead and place her on  a prednisone taper and upon completion may resume her 5 mg prednisone daily for management of her myasthenia gravis.  Tessalon Perles.  Declines respiratory PCR today  If symptoms were to acutely worsen, I do want her to go to the ED or urgent treatment pending severity  Upon exam today she is in no respiratory distress, is able to speak in complete sentences, and SpO2 is adequate at 93% on room air   Does complain of some left-sided chest discomfort upon inspiration and does appear to have findings of atelectasis on most recent CT scan from yesterday.  I do want her to gradually increase her activity and pulmonary toileting recommended.     I personally spent a total of 31 minutes on patient visit today including chart review, face to face with the patient obtaining the history and physical exam, review of pertinent images and tests, counseling and discussion and/or coordination of care as described above, and documentation.  Total time excludes time spent on other separate services such as performing procedures or test interpretation, if applicable.    Electronically signed by LISBETH Gan, 02/28/25, 2:23 PM EST.    Please note that portions of this note were completed with a voice recognition program.      CC: Geoffrey Kim, DO

## 2025-03-04 RX ORDER — ESTRADIOL 0.1 MG/D
FILM, EXTENDED RELEASE TRANSDERMAL
Qty: 24 PATCH | Refills: 11 | Status: SHIPPED | OUTPATIENT
Start: 2025-03-04

## 2025-08-08 ENCOUNTER — HOSPITAL ENCOUNTER (OUTPATIENT)
Dept: CT IMAGING | Facility: HOSPITAL | Age: 65
Discharge: HOME | End: 2025-08-08
Payer: MEDICARE

## 2025-08-08 DIAGNOSIS — R91.1 PULMONARY NODULE: ICD-10-CM

## 2025-08-08 PROCEDURE — 71250 CT THORAX DX C-: CPT

## 2025-08-15 ENCOUNTER — OFFICE VISIT (OUTPATIENT)
Dept: PULMONOLOGY | Facility: CLINIC | Age: 65
End: 2025-08-15
Payer: MEDICARE

## 2025-08-15 VITALS
TEMPERATURE: 96.4 F | DIASTOLIC BLOOD PRESSURE: 62 MMHG | SYSTOLIC BLOOD PRESSURE: 116 MMHG | OXYGEN SATURATION: 97 % | HEIGHT: 67 IN | HEART RATE: 81 BPM | WEIGHT: 171 LBS | BODY MASS INDEX: 26.84 KG/M2

## 2025-08-15 DIAGNOSIS — R91.1 PULMONARY NODULE: Primary | ICD-10-CM

## 2025-08-15 DIAGNOSIS — Z78.9 NON-SMOKER: ICD-10-CM

## 2025-08-15 PROCEDURE — 3078F DIAST BP <80 MM HG: CPT | Performed by: NURSE PRACTITIONER

## 2025-08-15 PROCEDURE — 3074F SYST BP LT 130 MM HG: CPT | Performed by: NURSE PRACTITIONER

## 2025-08-15 PROCEDURE — 99214 OFFICE O/P EST MOD 30 MIN: CPT | Performed by: NURSE PRACTITIONER

## 2025-08-22 ENCOUNTER — TELEPHONE (OUTPATIENT)
Dept: FAMILY MEDICINE CLINIC | Facility: CLINIC | Age: 65
End: 2025-08-22

## 2025-08-22 ENCOUNTER — LAB (OUTPATIENT)
Dept: LAB | Facility: HOSPITAL | Age: 65
End: 2025-08-22
Payer: MEDICARE

## 2025-08-22 ENCOUNTER — OFFICE VISIT (OUTPATIENT)
Dept: FAMILY MEDICINE CLINIC | Facility: CLINIC | Age: 65
End: 2025-08-22
Payer: MEDICARE

## 2025-08-22 VITALS
HEIGHT: 67 IN | SYSTOLIC BLOOD PRESSURE: 114 MMHG | WEIGHT: 168 LBS | HEART RATE: 80 BPM | TEMPERATURE: 98 F | DIASTOLIC BLOOD PRESSURE: 74 MMHG | BODY MASS INDEX: 26.37 KG/M2

## 2025-08-22 DIAGNOSIS — J06.9 VIRAL UPPER RESPIRATORY TRACT INFECTION: ICD-10-CM

## 2025-08-22 DIAGNOSIS — R30.0 DYSURIA: ICD-10-CM

## 2025-08-22 DIAGNOSIS — I10 ESSENTIAL HYPERTENSION: ICD-10-CM

## 2025-08-22 DIAGNOSIS — Z79.52 ON PREDNISONE THERAPY: ICD-10-CM

## 2025-08-22 DIAGNOSIS — Z00.00 MEDICARE ANNUAL WELLNESS VISIT, SUBSEQUENT: Primary | ICD-10-CM

## 2025-08-22 DIAGNOSIS — I10 PRIMARY HYPERTENSION: ICD-10-CM

## 2025-08-22 RX ORDER — BENZONATATE 100 MG/1
200 CAPSULE ORAL 3 TIMES DAILY PRN
Qty: 90 CAPSULE | Refills: 0 | Status: SHIPPED | OUTPATIENT
Start: 2025-08-22

## 2025-08-22 RX ORDER — ESTRADIOL 0.1 MG/D
1 FILM, EXTENDED RELEASE TRANSDERMAL 2 TIMES WEEKLY
Qty: 24 PATCH | Refills: 11 | Status: SHIPPED | OUTPATIENT
Start: 2025-08-25

## 2025-08-22 RX ORDER — LOSARTAN POTASSIUM 50 MG/1
50 TABLET ORAL DAILY
Qty: 90 TABLET | Refills: 3 | Status: SHIPPED | OUTPATIENT
Start: 2025-08-22

## 2025-08-22 RX ORDER — ESTRADIOL 0.1 MG/D
1 FILM, EXTENDED RELEASE TRANSDERMAL 2 TIMES WEEKLY
Qty: 24 PATCH | Refills: 11 | Status: SHIPPED | OUTPATIENT
Start: 2025-08-25 | End: 2025-08-22

## 2025-08-22 RX ORDER — LIDOCAINE HYDROCHLORIDE 30 MG/G
CREAM TOPICAL EVERY 4 HOURS PRN
Qty: 85 G | Refills: 3 | Status: SHIPPED | OUTPATIENT
Start: 2025-08-22

## 2025-08-23 LAB
BILIRUB UR QL STRIP: NEGATIVE
CLARITY UR: CLEAR
COLOR UR: YELLOW
GLUCOSE UR STRIP-MCNC: NEGATIVE MG/DL
HGB UR QL STRIP.AUTO: NEGATIVE
HOLD SPECIMEN: NORMAL
KETONES UR QL STRIP: NEGATIVE
LEUKOCYTE ESTERASE UR QL STRIP.AUTO: NEGATIVE
NITRITE UR QL STRIP: NEGATIVE
PH UR STRIP.AUTO: 7 [PH] (ref 5–8)
PROT UR QL STRIP: NEGATIVE
SP GR UR STRIP: <=1.005 (ref 1–1.03)
UROBILINOGEN UR QL STRIP: NORMAL

## 2025-08-29 RX ORDER — LIDOCAINE HYDROCHLORIDE AND HYDROCORTISONE ACETATE 30; 5 MG/G; MG/G
CREAM RECTAL
Qty: 1 EACH | Refills: 11 | Status: SHIPPED | OUTPATIENT
Start: 2025-08-29

## (undated) DEVICE — [HIGH FLOW HEATED INSUFFLATOR TUBING,  DO NOT USE IF PACKAGE IS DAMAGED]

## (undated) DEVICE — ENDOPATH XCEL UNIVERSAL TROCAR STABLILITY SLEEVES: Brand: ENDOPATH XCEL

## (undated) DEVICE — Device

## (undated) DEVICE — SYR LUERLOK 20CC

## (undated) DEVICE — ENDOPATH XCEL BLADELESS TROCARS WITH STABILITY SLEEVES: Brand: ENDOPATH XCEL

## (undated) DEVICE — GOWN,PREVENTION PLUS,XXLARGE,STERILE: Brand: MEDLINE

## (undated) DEVICE — ANTIBACTERIAL UNDYED BRAIDED (POLYGLACTIN 910), SYNTHETIC ABSORBABLE SUTURE: Brand: COATED VICRYL

## (undated) DEVICE — SNAP KOVER: Brand: UNBRANDED

## (undated) DEVICE — ENDOPOUCH RETRIEVER SPECIMEN RETRIEVAL BAGS: Brand: ENDOPOUCH RETRIEVER

## (undated) DEVICE — SYR LUERLOK 30CC

## (undated) DEVICE — DRSNG TELFA PAD NONADH STR 1S 3X4IN

## (undated) DEVICE — MEDI-VAC YANKAUER SUCTION HANDLE W/BULBOUS TIP: Brand: CARDINAL HEALTH

## (undated) DEVICE — GLV SURG SENSICARE MICRO PF LF 7.5 STRL

## (undated) DEVICE — PK LAP LASR CHOLE 10

## (undated) DEVICE — 3M™ STERI-STRIP™ REINFORCED ADHESIVE SKIN CLOSURES, R1547, 1/2 IN X 4 IN (12 MM X 100 MM), 6 STRIPS/ENVELOPE: Brand: 3M™ STERI-STRIP™

## (undated) DEVICE — DRSNG SURESITE WNDW 2.38X2.75

## (undated) DEVICE — VISUALIZATION SYSTEM: Brand: CLEARIFY

## (undated) DEVICE — LUER-LOK 360°: Brand: CONNECTA, LUER-LOK

## (undated) DEVICE — SUT SILK 2/0 TIES 18IN A185H

## (undated) DEVICE — AIRWY 90MM NO9

## (undated) DEVICE — SUT VIC 0 UR6 27IN VCP603H

## (undated) DEVICE — CANNULA,OXY,ADULT,SUPERSOFT,W/7'TUB,UC: Brand: MEDLINE

## (undated) DEVICE — MEDI-VAC NON-CONDUCTIVE SUCTION TUBING: Brand: CARDINAL HEALTH

## (undated) DEVICE — ENDOPATH XCEL BLUNT TIP TROCARS WITH SMOOTH SLEEVES: Brand: ENDOPATH XCEL

## (undated) DEVICE — GLV SURG SENSICARE MICRO PF LF 7 STRL

## (undated) DEVICE — COVER,LIGHT HANDLE,FLX,1/PK: Brand: MEDLINE INDUSTRIES, INC.

## (undated) DEVICE — ST EXT IV TBG W SECUR LK 20IN

## (undated) DEVICE — ENDOCUT SCISSOR TIP, DISPOSABLE: Brand: RENEW